# Patient Record
Sex: MALE | Race: WHITE
[De-identification: names, ages, dates, MRNs, and addresses within clinical notes are randomized per-mention and may not be internally consistent; named-entity substitution may affect disease eponyms.]

---

## 2017-06-21 ENCOUNTER — HOSPITAL ENCOUNTER (EMERGENCY)
Dept: HOSPITAL 33 - ED | Age: 52
LOS: 1 days | Discharge: ANOTHER HOSPITAL | End: 2017-06-22
Payer: SELF-PAY

## 2017-06-21 DIAGNOSIS — T18.128A: Primary | ICD-10-CM

## 2017-06-21 PROCEDURE — 36000 PLACE NEEDLE IN VEIN: CPT

## 2017-06-21 PROCEDURE — 96375 TX/PRO/DX INJ NEW DRUG ADDON: CPT

## 2017-06-21 PROCEDURE — 99285 EMERGENCY DEPT VISIT HI MDM: CPT

## 2017-06-21 PROCEDURE — 96374 THER/PROPH/DIAG INJ IV PUSH: CPT

## 2017-06-21 PROCEDURE — 70360 X-RAY EXAM OF NECK: CPT

## 2017-06-21 PROCEDURE — 96376 TX/PRO/DX INJ SAME DRUG ADON: CPT

## 2017-06-21 NOTE — ERPHSYRPT
- History of Present Illness


Time Seen by Provider: 06/21/17 23:33


Source: patient


Exam Limitations: no limitations


Patient Subjective Stated Complaint: pt states he got choked on a piece of 

turkey this evening adn feels like it is still stuck in his throat.


Triage Nursing Assessment: pt alert and oriented. answers questions approp. pt 

ambulatory with steady gait noted. respirations nonlabored with lungs cta. pt 

restless in bed and holding throat when he swallows. nothing visualized in back 

of throat. no gagging or vomiting at this time.


Physician History: 





The patient is a 51-year-old male with his wife complaining of hitting a piece 

of turkey meat stuck in his throat while eating 7 hours ago.  He was chewing 

the meat when it accidentally slipped down his throat.  He coughed it up.  He 

felt like there was still a small piece stuck in his throat so he stuck his 

finger down his throat in order to make him vomit.  He vomited.  Now it feels 

like his throat is very painful when he swallows.  He is unsure if anything is 

still in his throat.  He has been able to drink significant amounts of water.  

When he vomited, he had some blood in his vomit.  He has no problems breathing.

  His past medical history is significant for COPD and diabetes.


Timing/Duration: hour(s) (7), sudden


Severity: moderate


Modifying Factors: Improves With: nothing


Associated Symptoms: vomiting (self-induced)


Allergies/Adverse Reactions: 








No Known Drug Allergies Allergy (Verified 06/21/17 23:33)


 





Home Medications: 








Metformin HCl 1000 mg [Glucophage 1000 MG] 1,000 mg PO BID 04/03/15 [History]


Hum Insulin NPH/Reg Insulin Hm [Humulin 70-30 Vial] 30 unit SQ BID 06/21/17 [

History]





Hx Tetanus, Diphtheria Vaccination/Date Given: Yes


Hx Influenza Vaccination/Date Given: No


Hx Pneumococcal Vaccination/Date Given: No


Immunizations Up to Date: Yes





- Review of Systems


Constitutional: No Fever, No Chills


Eyes: No Symptoms


Ears, Nose, & Throat: Throat Pain


Respiratory: No Cough, No Dyspnea


Cardiac: No Chest Pain, No Edema, No Syncope


Abdominal/Gastrointestinal: Vomiting (self-induced)


Genitourinary Symptoms: No Dysuria


Musculoskeletal: No Back Pain, No Neck Pain


Skin: No Rash


Neurological: No Dizziness, No Focal Weakness, No Sensory Changes


Psychological: No Symptoms


Endocrine: No Symptoms


Hematologic/Lymphatic: No Symptoms


Immunological/Allergic: No Symptoms


All Other Systems: Reviewed and Negative





- Past Medical History


Pertinent Past Medical History: Yes


Endocrine Medical History: Diabetes Type II





- Past Surgical History


Past Surgical History: No





- Social History


Smoking Status: Current every day smoker


How long have you smoked: 35 yrs


Exposure to second hand smoke: Yes


Drug Use: none


Patient Lives Alone: No





- Nursing Vital Signs


Nursing Vital Signs: 


 Initial Vital Signs











Temperature                    97.9 F


 


Temperature Source             Oral


 


Pulse Rate                     96


 


Respiratory Rate               16


 


Blood Pressure []              99/68


 


Blood Pressure                 122/83


 


Pain Intensity                 6

















- Physical Exam


General Appearance: moderate distress (distress only when swallowing)


Eye Exam: PERRL/EOMI, eyes nml inspection


Ears, Nose, Throat Exam: normal ENT inspection, TMs normal, pharynx normal, 

moist mucous membranes


Neck Exam: normal inspection, non-tender, supple, full range of motion


Respiratory Exam: normal breath sounds, lungs clear, No respiratory distress


Cardiovascular Exam: regular rate/rhythm, normal heart sounds, normal 

peripheral pulses


Gastrointestinal/Abdomen Exam: soft, normal bowel sounds, No tenderness, No mass


Rectal Exam: not done


Back Exam: normal inspection, normal range of motion, No CVA tenderness, No 

vertebral tenderness


Extremity Exam: normal inspection, normal range of motion, pelvis stable


Neurologic Exam: alert, oriented x 3, cooperative, normal mood/affect, nml 

cerebellar function, nml station & gait, sensation nml, No motor deficits


Skin Exam: normal color, warm, dry, No rash


Lymphatic Exam: No adenopathy


**SpO2 Interpretation**: normal


SpO2: 100


Oxygen Delivery: Room Air





- Radiology Exams


  ** Other


X-ray Interpretation: Interpreted by me, Other (Possible FB in proximal 

anterior esophagus.)


Ordered Tests: 


 Active Orders 24 hr











 Category Date Time Status


 


 IV Insertion STAT Care  06/21/17 23:46 Active


 


 NECK SOFT TISSUE Stat Exams  06/21/17 23:45 Ordered








Medication Summary











Generic Name Dose Route Start Last Admin





  Trade Name Freq  PRN Reason Stop Dose Admin


 


Nitroglycerin  0.4 mg  06/22/17 01:00  06/22/17 01:04





  Nitrostat 0.4 Mg Tablet***  SL  07/22/17 00:59  0.4 mg





  PRN PRN   Administration





  CHEST PAIN   














Discontinued Medications














Generic Name Dose Route Start Last Admin





  Trade Name Freq  PRN Reason Stop Dose Admin


 


Al Hydrox/Mg Hydrox/Simethicone  Confirm  06/22/17 00:04  





  Maalox Es 30 Ml Unit Dose***  Administered  06/22/17 00:05  





  Dose   





  30 ml   





  .ROUTE   





  .STK-MED ONE   


 


Belladonna Alkaloids/Phenobarbital  60 ml  06/21/17 23:45  06/22/17 00:09





  Gi Cocktail 60ml (Belladonn/Phenobarb/Lidoc*  PO  06/21/17 23:46  60 ml





  STAT ONE   Administration


 


Belladonna Alkaloids/Phenobarbital  Confirm  06/22/17 00:04  





  Donnatol Liquid***  Administered  06/22/17 00:05  





  Dose   





  64.8 mg   





  .ROUTE   





  .STK-MED ONE   


 


Glucagon  Confirm  06/22/17 00:28  





  Glucagen 1 Mg***  Administered  06/22/17 00:29  





  Dose   





  1 mg   





  .ROUTE   





  .STK-MED ONE   


 


Glucagon  1 mg  06/22/17 00:29  06/22/17 00:30





  Glucagen 1 Mg***  IV  06/22/17 00:30  1 mg





  STAT ONE   Administration


 


Glucagon  1 mg  06/22/17 00:46  06/22/17 00:52





  Glucagen 1 Mg***  IV  06/22/17 00:47  1 mg





  STAT ONE   Administration


 


Lidocaine HCl  Confirm  06/22/17 00:03  





  Xylocaine Hcl Viscous *  Administered  06/22/17 00:04  





  Dose   





  20 ml   





  .ROUTE   





  .STK-MED ONE   


 


Morphine Sulfate  4 mg  06/22/17 00:33  06/22/17 00:38





  Morphine Sulfate 4 Mg Inj***  IV  06/22/17 00:34  4 mg





  STAT ONE   Administration


 


Morphine Sulfate  Confirm  06/22/17 00:36  





  Morphine Sulfate 4 Mg Inj***  Administered  06/22/17 00:37  





  Dose   





  4 mg   





  .ROUTE   





  .STK-MED ONE   


 


Morphine Sulfate  4 mg  06/22/17 01:13  06/22/17 01:15





  Morphine Sulfate 4 Mg Inj***  IV  06/22/17 01:14  4 mg





  STAT ONE   Administration


 


Morphine Sulfate  Confirm  06/22/17 01:15  





  Morphine Sulfate 4 Mg Inj***  Administered  06/22/17 01:16  





  Dose   





  4 mg   





  .ROUTE   





  .STK-MED ONE   


 


Nitroglycerin  Confirm  06/22/17 01:00  





  Nitrostat 0.4 Mg (Ed)***  Administered  06/22/17 01:01  





  Dose   





  0.4 mg   





  SL   





  .STK-MED ONE   














- Progress


Progress: unchanged


Progress Note: 





06/22/17 01:13


The patient was given glucagon 1 mg 2 by IV and nitroglycerin 0.4 mg 

sublingual without improvement.  The patient consumed several ounces of a 

carbonated beverage without relief and without vomiting.  The patient complains 

of severe proximal esophageal pain.


Counseled pt/family regarding: diagnosis, rad results





- Departure


Time of Disposition: 01:41


Departure Disposition: Transfer (Transfer to [facility] per hospitalist, Dr Rivers, and GI doctor, Dr Velazquez.)


Clinical Impression: 


 Esophageal foreign body





Condition: Stable


Critical Care Time: No


Additional Instructions: 


You are being transferred to [facility] per  and Dr Velazquez for an 

esophageal foreign body.  You were given glucagon 1 mg IV 2 and nitroglycerin 

glycerin 0.4 mg sublingual in the ER.  You were also given morphine 4 mg IV 2.

## 2017-06-22 VITALS — SYSTOLIC BLOOD PRESSURE: 97 MMHG | DIASTOLIC BLOOD PRESSURE: 64 MMHG | HEART RATE: 78 BPM | OXYGEN SATURATION: 98 %

## 2017-06-22 NOTE — XRAY
Exam: Two-view neck soft tissues from 6/22/2017.



Comparison: None.



Indication: Patient choked while eating turkey.  States he feels like it is

stuck at the C5 or C6 level.  Rule out foreign body.



Findings: Upright AP and lateral images were obtained.



The epiglottis, hypopharynx, and tracheal airway appear unremarkable.  No

prevertebral soft tissue swelling is seen.  The hyoid bone and thyroid

cartilage appear unremarkable.



There is a 4 mm in length, transversely oriented, linear radiodensity anterior

to the C5 level which could possibly relate to a small ingested foreign body,

perhaps representing a tiny bone fragment.



I note early/mild degenerative disc disease at C4-C5 manifested by minimal

narrowing of the interspace with mild anterior and minimal posterior vertebral

endplate spurring.  The remainder of the cervical spine appears unremarkable.



Impression:



1.  There is a 4 mm transverse linear density seen about 13-14 mm anterior to

the lower C5 level on the lateral radiograph of unclear etiology.  This could

possibly represent an ingested foreign body density within the proximal

cervical esophagus ( ? bone fragment).



2.  Mild/early degenerative disc disease is seen at C4-C5.

## 2018-09-02 ENCOUNTER — HOSPITAL ENCOUNTER (EMERGENCY)
Dept: HOSPITAL 33 - ED | Age: 53
Discharge: HOME | End: 2018-09-02
Payer: SELF-PAY

## 2018-09-02 VITALS — OXYGEN SATURATION: 99 % | SYSTOLIC BLOOD PRESSURE: 109 MMHG | DIASTOLIC BLOOD PRESSURE: 96 MMHG | HEART RATE: 68 BPM

## 2018-09-02 DIAGNOSIS — S03.2XXA: ICD-10-CM

## 2018-09-02 DIAGNOSIS — R56.9: Primary | ICD-10-CM

## 2018-09-02 DIAGNOSIS — E11.9: ICD-10-CM

## 2018-09-02 DIAGNOSIS — Z79.4: ICD-10-CM

## 2018-09-02 LAB
ALBUMIN SERPL-MCNC: 4.8 G/DL (ref 3.5–5)
ALP SERPL-CCNC: 96 U/L (ref 38–126)
ALT SERPL-CCNC: 20 U/L (ref 0–50)
AMPHETAMINES UR QL: NEGATIVE
ANION GAP SERPL CALC-SCNC: 18 MEQ/L (ref 5–15)
AST SERPL QL: 23 U/L (ref 17–59)
BARBITURATES UR QL: NEGATIVE
BASOPHILS # BLD AUTO: 0.03 10*3/UL (ref 0–0.4)
BASOPHILS NFR BLD AUTO: 0.3 % (ref 0–0.4)
BENZODIAZ UR QL SCN: NEGATIVE
BILIRUB BLD-MCNC: 0.3 MG/DL (ref 0.2–1.3)
BUN SERPL-MCNC: 18 MG/DL (ref 9–20)
CALCIUM SPEC-MCNC: 9.4 MG/DL (ref 8.4–10.2)
CHLORIDE SERPL-SCNC: 105 MMOL/L (ref 98–107)
CO2 SERPL-SCNC: 24 MMOL/L (ref 22–30)
COCAINE UR QL SCN: NEGATIVE
CREAT SERPL-MCNC: 0.87 MG/DL (ref 0.66–1.25)
EOSINOPHIL # BLD AUTO: 0.08 10*3/UL (ref 0–0.5)
GLUCOSE SERPL-MCNC: 80 MG/DL (ref 74–106)
GLUCOSE UR-MCNC: NEGATIVE MG/DL
GRANULOCYTES # BLD AUTO: 8.65 10*3/UL (ref 1.4–6.9)
HCT VFR BLD AUTO: 43.1 % (ref 42–50)
HGB BLD-MCNC: 14.7 GM/DL (ref 12.5–18)
LYMPHOCYTES # SPEC AUTO: 2.07 10*3/UL (ref 1–4.6)
MCH RBC QN AUTO: 32.3 PG (ref 26–32)
MCHC RBC AUTO-ENTMCNC: 34.1 G/DL (ref 32–36)
METHADONE UR QL: NEGATIVE
MONOCYTES # BLD AUTO: 0.63 10*3/UL (ref 0–1.3)
NEUTROPHILS NFR BLD AUTO: 75.4 % (ref 36–66)
OPIATES UR QL: NEGATIVE
PCP UR QL CFM>20 NG/ML: NEGATIVE
PLATELET # BLD AUTO: 261 K/MM3 (ref 150–450)
POTASSIUM SERPLBLD-SCNC: 3.7 MMOL/L (ref 3.5–5.1)
PROT SERPL-MCNC: 8 G/DL (ref 6.3–8.2)
PROT UR STRIP-MCNC: (no result) MG/DL
RBC # BLD AUTO: 4.55 M/MM3 (ref 4.1–5.6)
RBC # URNS HPF: (no result) /HPF (ref 0–2)
SODIUM SERPL-SCNC: 143 MMOL/L (ref 137–145)
THC UR QL SCN: NEGATIVE
WBC # BLD AUTO: 11.5 K/MM3 (ref 4–10.5)
WBC URNS QL MICRO: (no result) /HPF (ref 0–5)

## 2018-09-02 PROCEDURE — 81000 URINALYSIS NONAUTO W/SCOPE: CPT

## 2018-09-02 PROCEDURE — 70450 CT HEAD/BRAIN W/O DYE: CPT

## 2018-09-02 PROCEDURE — G0480 DRUG TEST DEF 1-7 CLASSES: HCPCS

## 2018-09-02 PROCEDURE — 99284 EMERGENCY DEPT VISIT MOD MDM: CPT

## 2018-09-02 PROCEDURE — 93005 ELECTROCARDIOGRAM TRACING: CPT

## 2018-09-02 PROCEDURE — 80307 DRUG TEST PRSMV CHEM ANLYZR: CPT

## 2018-09-02 PROCEDURE — 85025 COMPLETE CBC W/AUTO DIFF WBC: CPT

## 2018-09-02 PROCEDURE — 36415 COLL VENOUS BLD VENIPUNCTURE: CPT

## 2018-09-02 PROCEDURE — 82962 GLUCOSE BLOOD TEST: CPT

## 2018-09-02 PROCEDURE — 96361 HYDRATE IV INFUSION ADD-ON: CPT

## 2018-09-02 PROCEDURE — 80053 COMPREHEN METABOLIC PANEL: CPT

## 2018-09-02 PROCEDURE — 96360 HYDRATION IV INFUSION INIT: CPT

## 2018-09-02 NOTE — XRAY
Indication: Nausea, headache, and loss of consciousness.  Seizure.



Multiple contiguous axial images obtained through the head without contrast.



Comparison: None



Normal appearing brain parenchyma, ventricles, and bony calvarium.  Minimal

mucosal thickening of both maxillary sinuses.  Remaining visualized paranasal

sinuses and mastoid air cells are clear.



Impression: No acute intracranial abnormalities.  Minimal paranasal sinus

disease.



Comment: Preliminary interpretation was made by [facility].  No discrepancy.



CTDI 49.71

## 2018-09-02 NOTE — ERPHSYRPT
- History of Present Illness


Time Seen by Provider: 09/02/18 09:32


Source: patient


Exam Limitations: no limitations


Physician History: 





This 52-year-old white male with history of diabetes COPD.


He is brought by the medics patient apparently had a seizure in bed next to his 

significant other.


Patient was noted to be postictal by the medics prior to arrival.


Currently patient is alert oriented 3.  Patient denies previous seizure 

activity.








Past medical history includes COPD and diabetes.





Past surgical history is negative.





Social history positive tobacco use.


Occasional alcohol use last time 2 weeks ago.


Denies illicit drug use.











Timing/Duration: today (just prior to arrival)


Severity: moderate


Modifying Factors: Improves With: nothing


Associated Symptoms: other (patient had a seizure aned bit his tongue just 

prior to arrival), No nausea, No vomiting, No abdominal pain, No shortness of 

breath, No heartburn, No diaphoresis, No cough, No chills, No chest pain, No 

fever, No headaches, No loss of appetite, No malaise, No rash, No syncope, No 

seizure, No weakness


Allergies/Adverse Reactions: 








No Known Drug Allergies Allergy (Verified 06/21/17 23:33)


 





Home Medications: 








Metformin HCl 1000 mg [Glucophage 1000 MG] 1,000 mg PO BID 04/03/15 [History]


Hum Insulin NPH/Reg Insulin Hm [Humulin 70-30 Vial] 30 unit SQ BID 06/21/17 [

History]





Hx Tetanus, Diphtheria Vaccination/Date Given: Yes


Hx Influenza Vaccination/Date Given: No


Hx Pneumococcal Vaccination/Date Given: No





- Review of Systems


Constitutional: No Fever, No Chills


Eyes: No Symptoms


Ears, Nose, & Throat: Other (patient bit his tongue), No Ear Pain, No Ear 

Discharge, No Hearing Changes, No Tinnitus, No Nose Pain, No Nose Congestion, 

No Nose Discharge, No Sinus Drainage, No Epistaxis, No Mouth Pain, No Mouth 

Swelling, No Loose Teeth, No Throat Pain, No Throat Swelling, No Hoarse, No 

Painful Swallowing, No Snoring, No Stridor


Respiratory: No Cough, No Dyspnea


Cardiac: No Chest Pain, No Edema, No Syncope


Abdominal/Gastrointestinal: No Abdominal Pain, No Nausea, No Vomiting, No 

Diarrhea


Genitourinary Symptoms: No Dysuria


Musculoskeletal: No Back Pain, No Neck Pain


Skin: No Rash


Neurological: Seizure (patiet had a seizure just pr heior to arrival)


Psychological: No Symptoms


Endocrine: No Symptoms


All Other Systems: Reviewed and Negative





- Past Medical History


Pertinent Past Medical History: Yes


Endocrine Medical History: Diabetes Type II





- Past Surgical History


Past Surgical History: No





- Social History


Smoking Status: Current every day smoker


How long have you smoked: 35 yrs


Exposure to second hand smoke: Yes


Drug Use: none


Patient Lives Alone: No





- Nursing Vital Signs


Nursing Vital Signs: 


 Initial Vital Signs











Temperature  97.8 F   09/02/18 09:26


 


Pulse Rate  78   09/02/18 09:26


 


Respiratory Rate  18   09/02/18 09:26


 


Blood Pressure  119/79   09/02/18 09:26


 


O2 Sat by Pulse Oximetry  98   09/02/18 09:26








 Pain Scale











Pain Intensity                 6

















- Physical Exam


General Appearance: no apparent distress, alert


Eye Exam: PERRL/EOMI, eyes nml inspection


Ears, Nose, Throat Exam: normal ENT inspection, TMs normal, pharynx normal, 

moist mucous membranes, other (several abrasions on tongue,avulsed right lower 

incisor, )


Neck Exam: normal inspection, non-tender, supple, full range of motion


Respiratory Exam: normal breath sounds, lungs clear, No respiratory distress


Cardiovascular Exam: regular rate/rhythm ( tooth him follow-up wit), normal 

heart sounds, normal peripheral pulses


Gastrointestinal/Abdomen Exam: soft, normal bowel sounds, No tenderness, No mass


Back Exam: normal inspection, normal range of motion, No CVA tenderness, No 

vertebral tenderness


Extremity Exam: normal inspection, normal range of motion, pelvis stable


Neurologic Exam: alert, oriented x 3, cooperative, CNs II-XII nml as tested, 

normal mood/affect, nml cerebellar function, nml station & gait, sensation nml, 

No motor deficits


Skin Exam: normal color, warm, dry, No rash


**SpO2 Interpretation**: normal (97%)





- Course


Nursing assessment & vital signs reviewed: Yes


EKG Interpreted by Me: RATE (72 bpm), Sinus Rhythm, NORMAL AXIS, Other (EKG: 

Sinus rhythm, 72 bpm, normal axis, no acute ST or T wave changes noted.)





- CT Exams


  ** Head


CT Interpretation: Tele-radiologist Report (head CT: Impression: 1.  No acute 

intracranial abnormality, 2.  Volume loss.)


Ordered Tests: 


 Active Orders 24 hr











 Category Date Time Status


 


 Accucheck STAT Care  09/02/18 09:30 Active


 


 EKG-ER Only STAT Care  09/02/18 09:30 Active


 


 IV Insertion STAT Care  09/02/18 09:30 Active


 


 Pulse Oximetry (ED) STAT Care  09/02/18 09:30 Active


 


 Seizure Precautions -SCCHED STAT Care  09/02/18 09:30 Active


 


 HEAD WITHOUT CONTRAST [CT] Stat Exams  09/02/18 09:31 Taken


 


 CBC W DIFF Stat Lab  09/02/18 09:30 Completed


 


 CMP Stat Lab  09/02/18 09:30 Completed


 


 ETHYL ALCOHOL Stat Lab  09/02/18 09:30 Completed


 


 UA W/ MICROSCOPIC Stat Lab  09/02/18 11:00 Completed


 


 Urine Triage Profile Stat Lab  09/02/18 11:00 Completed








Medication Summary











Generic Name Dose Route Start Last Admin





  Trade Name Freq  PRN Reason Stop Dose Admin


 


Sodium Chloride  1,000 mls @ 100 mls/hr  09/02/18 09:30  09/02/18 10:46





  Sodium Chloride 0.9% 1000 Ml  IV  10/02/18 09:29  100 mls/hr





  .Q10H SAURABH   Administration





     





     





     





     














Discontinued Medications














Generic Name Dose Route Start Last Admin





  Trade Name Freq  PRN Reason Stop Dose Admin


 


Acetaminophen  650 mg  09/02/18 10:19  09/02/18 10:46





  Tylenol 325 Mg***  PO  09/02/18 10:20  650 mg





  STAT ONE   Administration





     





     





     





     


 


Acetaminophen  Confirm  09/02/18 10:38  





  Tylenol 325 Mg***  Administered  09/02/18 10:39  





  Dose   





  650 mg   





  .ROUTE   





  .STK-MED ONE   





     





     





     





     


 


Hydrocodone Bitart/Acetaminophen  1 tab  09/02/18 11:22  09/02/18 11:25





  Norco 5/325 Mg***  PO  09/02/18 11:23  1 tab





  STAT ONE   Administration





     





     





     





     


 


Hydrocodone Bitart/Acetaminophen  Confirm  09/02/18 11:24  





  Norco 5/325 Mg***  Administered  09/02/18 11:25  





  Dose   





  1 tab   





  .ROUTE   





  .STK-MED ONE   





     





     





     





     











Lab/Rad Data: 


 Laboratory Result Diagrams





 09/02/18 09:30 





 09/02/18 09:30 





 Laboratory Results











  09/02/18 09/02/18 09/02/18 Range/Units





  11:00 11:00 09:30 


 


WBC     (4.0-10.5)  K/mm3


 


RBC     (4.1-5.6)  M/mm3


 


Hgb     (12.5-18.0)  gm/dl


 


Hct     (42-50)  %


 


MCV     ()  fl


 


MCH     (26-32)  pg


 


MCHC     (32-36)  g/dl


 


RDW     (11.5-14.0)  %


 


Plt Count     (150-450)  K/mm3


 


MPV     (6-9.5)  fl


 


Gran %     (36.0-66.0)  %


 


Eos # (Auto)     (0-0.5)  


 


Absolute Lymphs (auto)     (1.0-4.6)  


 


Absolute Monos (auto)     (0.0-1.3)  


 


Lymphocytes %     (24.0-44.0)  %


 


Monocytes %     (0.0-12.0)  %


 


Eosinophils %     (0.00-5.0)  %


 


Basophils %     (0.0-0.4)  %


 


Absolute Granulocytes     (1.4-6.9)  


 


Basophils #     (0-0.4)  


 


Sodium     (137-145)  mmol/L


 


Potassium     (3.5-5.1)  mmol/L


 


Chloride     ()  mmol/L


 


Carbon Dioxide     (22-30)  mmol/L


 


Anion Gap     (5-15)  MEQ/L


 


BUN     (9-20)  mg/dL


 


Creatinine     (0.66-1.25)  mg/dL


 


Estimated GFR     ML/MIN


 


Glucose     ()  mg/dL


 


Calcium     (8.4-10.2)  mg/dL


 


Total Bilirubin     (0.2-1.3)  mg/dL


 


AST     (17-59)  U/L


 


ALT     (0-50)  U/L


 


Alkaline Phosphatase     ()  U/L


 


Serum Total Protein     (6.3-8.2)  g/dL


 


Albumin     (3.5-5.0)  g/dL


 


Ur Collection Type   CCMS   


 


Urine Color   YELLOW   (YELLOW)  


 


Urine Appearance   CLEAR   (CLEAR)  


 


Urine pH   5.0   (5-6)  


 


Ur Specific Gravity   1.020   (1.005-1.025)  


 


Urine Protein   TRACE   (Negative)  


 


Urine Ketones   MODERATE   (NEGATIVE)  


 


Urine Blood   250   (0-5)  Ernie/ul


 


Urine Nitrite   NEGATIVE   (NEGATIVE)  


 


Urine Bilirubin   NEGATIVE   (NEGATIVE)  


 


Urine Urobilinogen   NORMAL   (0-1)  mg/dL


 


Ur Leukocyte Esterase   NEGATIVE   (NEGATIVE)  


 


Urine Microscopic RBC   2-5   (0-2)  /HPF


 


Urine Microscopic WBC   0-2   (0-5)  /HPF


 


Ur Epithelial Cells   RARE   (FEW)  /HPF


 


Granular Casts   0-2   (NEGATIVE)  /LPF


 


Urine Mucus   MODERATE   (NEGATIVE)  /HPF


 


Urine Culture Reflexed   NO   (NO)  


 


Urine Glucose   NEGATIVE   (NEGATIVE)  mg/dL


 


Urine Opiates Level  NEGATIVE    (NEGATIVE)  


 


Ur Methadone  NEGATIVE    (NEGATIVE)  


 


Urine Barbiturates  NEGATIVE    (NEGATIVE)  


 


Ur Phencyclidine (PCP)  NEGATIVE    (NEGATIVE)  


 


Urine Amphetamine  NEGATIVE    (NEGATIVE)  


 


U Benzodiazepine Level  NEGATIVE    (NEGATIVE)  


 


Urine Cocaine  NEGATIVE    (NEGATIVE)  


 


Urine Marijuana (THC)  NEGATIVE    (NEGATIVE)  


 


Ethyl Alcohol    < 10  (0-10)  mg/dL


 


Specimen Received   1100 09/02/18 09/02/18 09/02/18 Range/Units





  09:30 09:30 


 


WBC   11.5 H  (4.0-10.5)  K/mm3


 


RBC   4.55  (4.1-5.6)  M/mm3


 


Hgb   14.7  (12.5-18.0)  gm/dl


 


Hct   43.1  (42-50)  %


 


MCV   94.7  ()  fl


 


MCH   32.3 H  (26-32)  pg


 


MCHC   34.1  (32-36)  g/dl


 


RDW   12.6  (11.5-14.0)  %


 


Plt Count   261  (150-450)  K/mm3


 


MPV   10.8 H  (6-9.5)  fl


 


Gran %   75.4 H  (36.0-66.0)  %


 


Eos # (Auto)   0.08  (0-0.5)  


 


Absolute Lymphs (auto)   2.07  (1.0-4.6)  


 


Absolute Monos (auto)   0.63  (0.0-1.3)  


 


Lymphocytes %   18.1 L  (24.0-44.0)  %


 


Monocytes %   5.5  (0.0-12.0)  %


 


Eosinophils %   0.7  (0.00-5.0)  %


 


Basophils %   0.3  (0.0-0.4)  %


 


Absolute Granulocytes   8.65 H  (1.4-6.9)  


 


Basophils #   0.03  (0-0.4)  


 


Sodium  143   (137-145)  mmol/L


 


Potassium  3.7   (3.5-5.1)  mmol/L


 


Chloride  105   ()  mmol/L


 


Carbon Dioxide  24   (22-30)  mmol/L


 


Anion Gap  18.0 H   (5-15)  MEQ/L


 


BUN  18   (9-20)  mg/dL


 


Creatinine  0.87   (0.66-1.25)  mg/dL


 


Estimated GFR  > 60.0   ML/MIN


 


Glucose  80   ()  mg/dL


 


Calcium  9.4   (8.4-10.2)  mg/dL


 


Total Bilirubin  0.30   (0.2-1.3)  mg/dL


 


AST  23   (17-59)  U/L


 


ALT  20   (0-50)  U/L


 


Alkaline Phosphatase  96   ()  U/L


 


Serum Total Protein  8.0   (6.3-8.2)  g/dL


 


Albumin  4.8   (3.5-5.0)  g/dL


 


Ur Collection Type    


 


Urine Color    (YELLOW)  


 


Urine Appearance    (CLEAR)  


 


Urine pH    (5-6)  


 


Ur Specific Gravity    (1.005-1.025)  


 


Urine Protein    (Negative)  


 


Urine Ketones    (NEGATIVE)  


 


Urine Blood    (0-5)  Ernie/ul


 


Urine Nitrite    (NEGATIVE)  


 


Urine Bilirubin    (NEGATIVE)  


 


Urine Urobilinogen    (0-1)  mg/dL


 


Ur Leukocyte Esterase    (NEGATIVE)  


 


Urine Microscopic RBC    (0-2)  /HPF


 


Urine Microscopic WBC    (0-5)  /HPF


 


Ur Epithelial Cells    (FEW)  /HPF


 


Granular Casts    (NEGATIVE)  /LPF


 


Urine Mucus    (NEGATIVE)  /HPF


 


Urine Culture Reflexed    (NO)  


 


Urine Glucose    (NEGATIVE)  mg/dL


 


Urine Opiates Level    (NEGATIVE)  


 


Ur Methadone    (NEGATIVE)  


 


Urine Barbiturates    (NEGATIVE)  


 


Ur Phencyclidine (PCP)    (NEGATIVE)  


 


Urine Amphetamine    (NEGATIVE)  


 


U Benzodiazepine Level    (NEGATIVE)  


 


Urine Cocaine    (NEGATIVE)  


 


Urine Marijuana (THC)    (NEGATIVE)  


 


Ethyl Alcohol    (0-10)  mg/dL


 


Specimen Received    














- Progress


Progress: improved


Progress Note: 





09/02/18 09:42


This is a 52-year-old white male who arrives with complaint of a seizure and 

was found in bed after tonic-clonic activity noted by his wife.


Patient initially postictally patient denies history of seizures in the past.


Patient apparently pulled a tooth out of his right lower incisor region.


This is been placed in milk.


Patient does have a history of diabetes he states he drinks occasionally last 

time he drank was 2 weeks ago he denies any illicit drug use.





.





09/02/18 10:46


I contacted Dr. Samuel at [facility] in [place] concerning the 

patient's avulsed tooth.


He states that the patient can present to the [place] office when he is done 

here..





Patient's tooth was placed in milk.


It is noted that there is only 1 4- 1/3 third of the tooth which appears to be 

root.





Head CT has been obtained.


I am awaiting urinalysis and UDS patient appears to be stable.


He was given Tylenol for pain in his tongue.








09/02/18 11:25


Patient's lab work was all essentially normal with the exception of moderate 

ketones in his urine.


Patient's glucose was running in the 80s.


Patient was given on Norco tablet because of complaints of pain.


The patient has been stable since arrival no further signs of seizures.








Will plan to discharge patient.


He will be given instructions as to no driving, no hazardous activity no 

heights.


He will need to follow-up with his family doctor.


He will also need to follow-up with 24-hour dental care [facility] in 

[place] on [address].


Today he is to bring his tooth with him.








- Departure


Time of Disposition: 11:27


Departure Disposition: Home


Clinical Impression: 


 Seizure





Avulsed tooth


Qualifiers:


 Encounter type: initial encounter Qualified Code(s): S03.2XXA - Dislocation of 

tooth, initial encounter





Condition: Fair


Critical Care Time: No


Referrals: 


XIN VERDUGO MD [Primary Care Provider] - 


Instructions:  Seizures, Adult (DC)


Additional Instructions: 


Return home.


No driving, no heights, no hazardous activity, take showers instead of baths, 

do not engage in any activity that might harm yourself or others.


Follow-up with your family doctor.


Follow-up with 24 hour dental care [facility], [address] [place], [place] 

today.bring your tooth with you leave it in milk.


Monitor your blood sugars carefully.


Return for acute distress or for severe symptoms.





Refer to 24 hour dental care


[phone] [address]

## 2018-09-26 ENCOUNTER — HOSPITAL ENCOUNTER (OUTPATIENT)
Dept: HOSPITAL 33 - ED | Age: 53
Setting detail: OBSERVATION
LOS: 2 days | Discharge: HOME | End: 2018-09-28
Attending: FAMILY MEDICINE | Admitting: FAMILY MEDICINE
Payer: COMMERCIAL

## 2018-09-26 DIAGNOSIS — R11.10: ICD-10-CM

## 2018-09-26 DIAGNOSIS — E11.10: Primary | ICD-10-CM

## 2018-09-26 DIAGNOSIS — Z79.899: ICD-10-CM

## 2018-09-26 DIAGNOSIS — Z79.4: ICD-10-CM

## 2018-09-26 DIAGNOSIS — F41.8: ICD-10-CM

## 2018-09-26 LAB
ALBUMIN SERPL-MCNC: 5.6 G/DL (ref 3.5–5)
ALP SERPL-CCNC: 167 U/L (ref 38–126)
ALT SERPL-CCNC: 24 U/L (ref 0–50)
AMPHETAMINES UR QL: NEGATIVE
AMYLASE SERPL-CCNC: 53 U/L (ref 30–110)
ANION GAP SERPL CALC-SCNC: 17.3 MEQ/L (ref 5–15)
ANION GAP SERPL CALC-SCNC: 27.4 MEQ/L (ref 5–15)
ANION GAP SERPL CALC-SCNC: 37.7 MEQ/L (ref 5–15)
APAP SPEC-MCNC: < 10 UG/ML (ref 10–30)
AST SERPL QL: 22 U/L (ref 17–59)
BARBITURATES UR QL: NEGATIVE
BASE EXCESS BLDV CALC-SCNC: -22.5 MMOL/L (ref -2–2)
BASOPHILS # BLD AUTO: 0.03 10*3/UL (ref 0–0.4)
BASOPHILS NFR BLD AUTO: 0.3 % (ref 0–0.4)
BENZODIAZ UR QL SCN: NEGATIVE
BILIRUB BLD-MCNC: 0.5 MG/DL (ref 0.2–1.3)
BUN SERPL-MCNC: 18 MG/DL (ref 9–20)
BUN SERPL-MCNC: 21 MG/DL (ref 9–20)
BUN SERPL-MCNC: 21 MG/DL (ref 9–20)
BUN SERPL-MCNC: 23 MG/DL (ref 9–20)
CALCIUM SPEC-MCNC: 10.1 MG/DL (ref 8.4–10.2)
CALCIUM SPEC-MCNC: 8.7 MG/DL (ref 8.4–10.2)
CALCIUM SPEC-MCNC: 8.9 MG/DL (ref 8.4–10.2)
CALCIUM SPEC-MCNC: 8.9 MG/DL (ref 8.4–10.2)
CHLORIDE SERPL-SCNC: 100 MMOL/L (ref 98–107)
CHLORIDE SERPL-SCNC: 106 MMOL/L (ref 98–107)
CHLORIDE SERPL-SCNC: 108 MMOL/L (ref 98–107)
CHLORIDE SERPL-SCNC: 109 MMOL/L (ref 98–107)
CO2 SERPL-SCNC: 13 MMOL/L (ref 22–30)
CO2 SERPL-SCNC: 8 MMOL/L (ref 22–30)
CO2 SERPL-SCNC: 8 MMOL/L (ref 22–30)
CO2 SERPL-SCNC: < 5 MMOL/L (ref 22–30)
COCAINE UR QL SCN: NEGATIVE
COHGB MFR BLDV: 2 % T HGB (ref 0–6.9)
CREAT SERPL-MCNC: 0.89 MG/DL (ref 0.66–1.25)
CREAT SERPL-MCNC: 1.18 MG/DL (ref 0.66–1.25)
CREAT SERPL-MCNC: 1.26 MG/DL (ref 0.66–1.25)
CREAT SERPL-MCNC: 1.38 MG/DL (ref 0.66–1.25)
EOSINOPHIL # BLD AUTO: 0.02 10*3/UL (ref 0–0.5)
GLUCOSE SERPL-MCNC: 187 MG/DL (ref 74–106)
GLUCOSE SERPL-MCNC: 208 MG/DL (ref 74–106)
GLUCOSE SERPL-MCNC: 278 MG/DL (ref 74–106)
GLUCOSE SERPL-MCNC: 303 MG/DL (ref 74–106)
GLUCOSE UR-MCNC: 1000 MG/DL
GRANULOCYTES # BLD AUTO: 7.39 10*3/UL (ref 1.4–6.9)
HCO3 BLDV-SCNC: 8.1 MEQ/L (ref 22–28)
HCT VFR BLD AUTO: 52.7 % (ref 42–50)
HGB BLD-MCNC: 18.2 GM/DL (ref 12.5–18)
HGB BLDV-MCNC: 16.6 G/DL
INHALED O2 CONCENTRATION: 21 %
LIPASE SERPL-CCNC: 41 U/L (ref 23–300)
LYMPHOCYTES # SPEC AUTO: 1.45 10*3/UL (ref 1–4.6)
MCH RBC QN AUTO: 31.8 PG (ref 26–32)
MCHC RBC AUTO-ENTMCNC: 34.5 G/DL (ref 32–36)
METHADONE UR QL: NEGATIVE
MONOCYTES # BLD AUTO: 0.2 10*3/UL (ref 0–1.3)
NEUTROPHILS NFR BLD AUTO: 81.3 % (ref 36–66)
OPIATES UR QL: NEGATIVE
PCO2 BLDV: 33 MM/HG (ref 42–55)
PCP UR QL CFM>20 NG/ML: NEGATIVE
PHOSPHATE SERPL-SCNC: 4.4 MG/DL (ref 2.5–4.5)
PLATELET # BLD AUTO: 302 K/MM3 (ref 150–450)
PO2 BLDV: 29 MM/HG (ref 25–40)
POTASSIUM BLDV-SCNC: 5.7 MMOL/L (ref 3.5–5.1)
POTASSIUM SERPLBLD-SCNC: 3.8 MMOL/L (ref 3.5–5.1)
POTASSIUM SERPLBLD-SCNC: 5 MMOL/L (ref 3.5–5.1)
POTASSIUM SERPLBLD-SCNC: 5.2 MMOL/L (ref 3.5–5.1)
POTASSIUM SERPLBLD-SCNC: 6.9 MMOL/L (ref 3.5–5.1)
PROT SERPL-MCNC: 9.7 G/DL (ref 6.3–8.2)
PROT UR STRIP-MCNC: 100 MG/DL
RBC # BLD AUTO: 5.71 M/MM3 (ref 4.1–5.6)
RBC # URNS HPF: (no result) /HPF (ref 0–2)
SALICYLATES SERPL-MCNC: < 1 MG/DL (ref 2–20)
SAO2 % BLDV: 52.1 % (ref 95–100)
SODIUM SERPL-SCNC: 135 MMOL/L (ref 137–145)
SODIUM SERPL-SCNC: 139 MMOL/L (ref 137–145)
SODIUM SERPL-SCNC: 139 MMOL/L (ref 137–145)
SODIUM SERPL-SCNC: 141 MMOL/L (ref 137–145)
THC UR QL SCN: NEGATIVE
WBC # BLD AUTO: 9.1 K/MM3 (ref 4–10.5)
WBC URNS QL MICRO: (no result) /HPF (ref 0–5)

## 2018-09-26 PROCEDURE — 93041 RHYTHM ECG TRACING: CPT

## 2018-09-26 PROCEDURE — 36415 COLL VENOUS BLD VENIPUNCTURE: CPT

## 2018-09-26 PROCEDURE — 83690 ASSAY OF LIPASE: CPT

## 2018-09-26 PROCEDURE — 87086 URINE CULTURE/COLONY COUNT: CPT

## 2018-09-26 PROCEDURE — 81001 URINALYSIS AUTO W/SCOPE: CPT

## 2018-09-26 PROCEDURE — G0480 DRUG TEST DEF 1-7 CLASSES: HCPCS

## 2018-09-26 PROCEDURE — 93005 ELECTROCARDIOGRAM TRACING: CPT

## 2018-09-26 PROCEDURE — 96375 TX/PRO/DX INJ NEW DRUG ADDON: CPT

## 2018-09-26 PROCEDURE — G0378 HOSPITAL OBSERVATION PER HR: HCPCS

## 2018-09-26 PROCEDURE — 82150 ASSAY OF AMYLASE: CPT

## 2018-09-26 PROCEDURE — 96361 HYDRATE IV INFUSION ADD-ON: CPT

## 2018-09-26 PROCEDURE — 99285 EMERGENCY DEPT VISIT HI MDM: CPT

## 2018-09-26 PROCEDURE — 84100 ASSAY OF PHOSPHORUS: CPT

## 2018-09-26 PROCEDURE — 93268 ECG RECORD/REVIEW: CPT

## 2018-09-26 PROCEDURE — 85025 COMPLETE CBC W/AUTO DIFF WBC: CPT

## 2018-09-26 PROCEDURE — 83036 HEMOGLOBIN GLYCOSYLATED A1C: CPT

## 2018-09-26 PROCEDURE — 82805 BLOOD GASES W/O2 SATURATION: CPT

## 2018-09-26 PROCEDURE — 82962 GLUCOSE BLOOD TEST: CPT

## 2018-09-26 PROCEDURE — 96360 HYDRATION IV INFUSION INIT: CPT

## 2018-09-26 PROCEDURE — 87040 BLOOD CULTURE FOR BACTERIA: CPT

## 2018-09-26 PROCEDURE — 80053 COMPREHEN METABOLIC PANEL: CPT

## 2018-09-26 PROCEDURE — 80307 DRUG TEST PRSMV CHEM ANLYZR: CPT

## 2018-09-26 PROCEDURE — 83735 ASSAY OF MAGNESIUM: CPT

## 2018-09-26 PROCEDURE — 96374 THER/PROPH/DIAG INJ IV PUSH: CPT

## 2018-09-26 PROCEDURE — 36000 PLACE NEEDLE IN VEIN: CPT

## 2018-09-26 PROCEDURE — 94762 N-INVAS EAR/PLS OXIMTRY CONT: CPT

## 2018-09-26 PROCEDURE — 80048 BASIC METABOLIC PNL TOTAL CA: CPT

## 2018-09-26 RX ADMIN — ONDANSETRON PRN MG: 2 INJECTION, SOLUTION INTRAMUSCULAR; INTRAVENOUS at 12:02

## 2018-09-26 RX ADMIN — MORPHINE SULFATE PRN MG: 4 INJECTION, SOLUTION INTRAMUSCULAR; INTRAVENOUS at 11:58

## 2018-09-26 RX ADMIN — SERTRALINE SCH MG: 50 TABLET, FILM COATED ORAL at 22:12

## 2018-09-26 RX ADMIN — MORPHINE SULFATE PRN MG: 4 INJECTION, SOLUTION INTRAMUSCULAR; INTRAVENOUS at 16:05

## 2018-09-26 RX ADMIN — INSULIN ASPART PRN UNIT: 100 INJECTION, SOLUTION INTRAVENOUS; SUBCUTANEOUS at 14:58

## 2018-09-26 RX ADMIN — INSULIN GLARGINE SCH UNIT: 100 INJECTION, SOLUTION SUBCUTANEOUS at 22:12

## 2018-09-26 RX ADMIN — INSULIN ASPART PRN UNIT: 100 INJECTION, SOLUTION INTRAVENOUS; SUBCUTANEOUS at 13:18

## 2018-09-26 RX ADMIN — INSULIN ASPART PRN UNIT: 100 INJECTION, SOLUTION INTRAVENOUS; SUBCUTANEOUS at 11:04

## 2018-09-26 RX ADMIN — ONDANSETRON PRN MG: 2 INJECTION, SOLUTION INTRAMUSCULAR; INTRAVENOUS at 16:06

## 2018-09-26 RX ADMIN — INSULIN ASPART PRN UNIT: 100 INJECTION, SOLUTION INTRAVENOUS; SUBCUTANEOUS at 15:02

## 2018-09-26 RX ADMIN — INSULIN GLARGINE SCH UNIT: 100 INJECTION, SOLUTION SUBCUTANEOUS at 11:04

## 2018-09-26 RX ADMIN — ONDANSETRON PRN MG: 2 INJECTION, SOLUTION INTRAMUSCULAR; INTRAVENOUS at 20:08

## 2018-09-26 RX ADMIN — INSULIN HUMAN PRN MLS/HR: 100 INJECTION, SOLUTION PARENTERAL at 15:34

## 2018-09-26 RX ADMIN — MORPHINE SULFATE PRN MG: 4 INJECTION, SOLUTION INTRAMUSCULAR; INTRAVENOUS at 20:08

## 2018-09-26 NOTE — HP
CHIEF COMPLAINT:  Vomiting, altered mental status. 



HISTORY OF PRESENT ILLNESS: Jigar Rubio is a 52 year-old male with a history of 
diabetes who I have seen in the past but he has been doctoring at a wellness clinic in 
[place] through his employer as of late. He has a history of type 2 diabetes. He is 
currently taking Jardiance 10 mg daily as well as Basaglar 10 units twice daily.  
Apparently he had been referred to Clarissa Headley, a nurse practitioner with [facility], but has not yet seen her but he has been in contact with her. In any event, 
he has been having wild fluctuations of his blood sugars over the proceeding several 
weeks. He was actually in the emergency department earlier this month on 09/02/2018 due to 
a seizure. At that time he was apparently hypoglycemic. He had frequent hypoglycemic 
excursions therefore his insulin which he was taking a mix Humulin 70/30 with 30 units 
subcu twice daily had been changed at that time. He had high blood sugars in the 200 and 
300's for the last several days. He had some confusion, some intermittent vomiting which 
has become more regular today. He is unable to keep anything down. His wife reports that 
he has poor short term memory since he had the seizure earlier this month. 



PAST MEDICAL HISTORY: Anxiety. Depression. Diabetes mellitus type 2. 



PAST SURGICAL HISTORY: He has had no previous surgeries.

 

CURRENT MEDICATIONS: Includes Zoloft 50 mg daily, Jardiance 10 mg daily, Basaglar 10 units 
subcu b.i.d.



ALLERGIES:  NKDA. 



SOCIAL HISTORY: He is a smoker, denies alcohol or drug use. He is employed. 



FAMILY HISTORY: Noncontributory. 



REVIEW OF SYSTEMS: GENERAL: No fever or chills. HEENT: No sore throat or difficulty 
swallowing. RESPIRATORY: No cough. No shortness of breath. CVS: No chest pain. No 
peripheral edema. No syncope. GI: Positive for nausea, vomiting. Denies abdominal pain. No 
diarrhea or constipation. SKIN: No rash. No lesions. NEUROLOGIC: He has had some frequent 
headaches. Feels confused and has had some dizziness over the preceding several days. No 
focal weakness, numbness or paresthesia. The remainder of systems reviewed on presentation 
was found to be negative. 



PHYSICAL EXAMINATION: Temperature 97.7F, pulse 90, blood pressure 116/76, respiratory rate 
13. Oxygen saturation 99% on room air. 

GENERAL:  He is alert, seen in his room with the lights out wearing sun glasses. He had 
some morphine for his headache so he is slightly lethargic but answers questions and 
responds appropriately, cooperative.  

HEENT: Head - Normocephalic, atraumatic.  

NECK: Supple without lymphadenopathy. 

CVS: Regular rate and rhythm. No murmur. No gallop. No rub. 

RESPIRATORY: Clear to auscultation bilaterally.

ABDOMEN: Soft, nontender, nondistended.  

SKIN:  Pink, warm and dry. 

EXTREMITIES: No clubbing. No cyanosis. No edema. 

NEUROLOGIC: No slurred speech. No facial droop. Moves all extremities equally. Sensation 
intact diffusely. No focal neurological deficits present. 



LAB DATA AND TESTS:  White blood cell count 9,100, hemoglobin 18.2, PLT count 302,000. 
Venous blood gas pH 7.00, pCO2 of 33. Bicarbonate 8.1. Chemistry: Sodium 141, potassium 
5.2, chloride 100, carbon dioxide 8, BUN 21, creatinine 1.38, glucose 278. UA shows a 
large amount of ketones with some proteinuria. Urine drug screen negative. Salicylates, 
acetaminophen and ethanol alcohol are negative.  On review of his previous record, head CT 
on 09/02/2018 showed no acute intracranial abnormality. 



ASSESSMENT:  Jigar Rubio is a 52 year-old male with previous history of diabetes 
mellitus type 2 who now presents with diabetic ketoacidosis with anion gap present. At 
this time due to the mild to moderate elevation of glucose he will be receiving frequent 
subcutaneous insulin injections. He has received 2 liters of normal saline here in the 
emergency department. Therefore I will follow his anion gap with serial BMP's. He will 
likely need an increase in his basal insulin and may need consider addition of 
short-acting subcutaneous insulin with needles. I am going to stop his Jardiance due to 
this episode of diabetic ketoacidosis. I feel as though his neurological symptoms are 
likely related to the diabetic ketoacidosis so we will see how he improves with treatment 
at this time. Again, the previous work up was negative in the emergency department and 
those records have been reviewed today.

## 2018-09-26 NOTE — ERPHSYRPT
- History of Present Illness


Time Seen by Provider: 09/26/18 07:09


Source: patient


Exam Limitations: no limitations


Patient Subjective Stated Complaint: Pt arrives to ER with c/o vomiting once 

last night and twice this morning. States had blood sugar related seizure on 9/2 /18, is DMII. Generlized weakness, fatigue. Concerned for dehydration.


Triage Nursing Assessment: see above


Physician History: 





52-year-old white male arrives with complaint of nausea and vomiting since 

yesterday apparently had been seen here September 2, 2018 secondary to a 

seizure.  The patient's wife states that he has been vomiting intermittently 

since he's been working with his family  secondary to this. the patient 

complains of general malaise.




















past medical history is positive for copd and Diabetes





past surgical history is negative.





social history positive for  tobacco use





Timing/Duration: yesterday


Severity: moderate


Modifying Factors: Improves With: nothing


Associated Symptoms: nausea, vomiting, malaise, No abdominal pain, No shortness 

of breath, No heartburn, No diaphoresis, No cough, No chills, No chest pain, No 

fever, No headaches, No loss of appetite, No rash, No syncope, No seizure, No 

weakness


Allergies/Adverse Reactions: 








No Known Drug Allergies Allergy (Verified 09/26/18 06:30)


 





Home Medications: 








Metformin HCl 1000 mg [Glucophage 1000 MG] 500 mg PO BID 04/03/15 [History]


Empagliflozin [Jardiance] 10 mg PO DAILY 09/26/18 [History]


Insulin Glargine,Hum.rec.anlog [Basaglar Kwikpen U-100] 10 unit SQ 09/26/18 [

History]


Promethazine HCl 25 mg*** [Phenergan 25 mg***] 25 mg PO 09/26/18 [History]


Sertraline HCl 50 mg** [Zoloft 50 mg Tablet**] 50 mg PO DAILY 09/26/18 [History]





Hx Tetanus, Diphtheria Vaccination/Date Given: Yes


Hx Influenza Vaccination/Date Given: No


Hx Pneumococcal Vaccination/Date Given: No





- Review of Systems


Constitutional: Malaise


Eyes: No Symptoms


Ears, Nose, & Throat: No Symptoms


Respiratory: No Cough, No Dyspnea


Cardiac: No Chest Pain, No Edema, No Syncope


Abdominal/Gastrointestinal: Nausea, Vomiting, No Abdominal Pain, No Diarrhea, 

No Constipation, No Hematemesis, No Hematochezia, No Melena, No Dysphagia, No 

Appetite Changes


Genitourinary Symptoms: No Dysuria


Musculoskeletal: No Back Pain, No Neck Pain


Skin: No Rash


Neurological: No Dizziness, No Focal Weakness, No Sensory Changes


Psychological: No Symptoms


Endocrine: No Symptoms


All Other Systems: Reviewed and Negative





- Past Medical History


Pertinent Past Medical History: Yes


Endocrine Medical History: Diabetes Type II





- Past Surgical History


Past Surgical History: No





- Social History


Smoking Status: Current every day smoker


How long have you smoked: 35 yrs


Exposure to second hand smoke: No


Drug Use: none


Patient Lives Alone: No





- Nursing Vital Signs


Nursing Vital Signs: 


 Initial Vital Signs











Pulse Rate  99 H  09/26/18 06:20


 


Blood Pressure  125/89   09/26/18 06:20


 


O2 Sat by Pulse Oximetry  95   09/26/18 06:20








 Pain Scale











Pain Intensity                 6

















- Physical Exam


General Appearance: moderate distress, alert, No no apparent distress, No 

lethargy, No cachetic, No obese, No thin


Eye Exam: PERRL/EOMI, eyes nml inspection


Ears, Nose, Throat Exam: normal ENT inspection, TMs normal, pharynx normal, 

moist mucous membranes


Neck Exam: normal inspection, non-tender, supple, full range of motion


Respiratory Exam: normal breath sounds, lungs clear, No respiratory distress


Cardiovascular Exam: regular rate/rhythm, normal heart sounds, normal 

peripheral pulses, capillary refill <2 sec


Gastrointestinal/Abdomen Exam: soft, normal bowel sounds, No tenderness, No mass


Back Exam: normal inspection, normal range of motion, No CVA tenderness, No 

vertebral tenderness


Extremity Exam: normal inspection, normal range of motion, pelvis stable


Neurologic Exam: alert, oriented x 3, cooperative, CNs II-XII nml as tested, 

normal mood/affect, nml cerebellar function, nml station & gait, sensation nml, 

No motor deficits


Skin Exam: normal color, warm, dry, No rash


**SpO2 Interpretation**: normal


Oxygen Delivery: Room Air





- Course


Nursing assessment & vital signs reviewed: Yes


EKG Interpreted by Me: RATE (92 bpm), Sinus Rhythm, NORMAL AXIS, Other (EKG: 

Sinus rhythm, 92 beats per minute, normal axis, no acute ST or T wavechanges, 

normal EKG)


Ordered Tests: 


 Active Orders 24 hr











 Category Date Time Status


 


 Accucheck STAT Care  09/26/18 06:37 Active


 


 Accucheck STAT Care  09/26/18 08:13 Active


 


 Cardiac Monitor STAT Care  09/26/18 06:39 Active


 


 EKG-ER Only STAT Care  09/26/18 06:37 Active


 


 IV Insertion STAT Care  09/26/18 06:37 Active


 


 ACETAMINOPHEN Stat Lab  09/26/18 06:30 Completed


 


 AMYLASE Stat Lab  09/26/18 06:30 Completed


 


 BLOOD CULTURE Stat Lab  09/26/18 06:10 Received


 


 CBC W DIFF Stat Lab  09/26/18 06:35 Completed


 


 CMP Stat Lab  09/26/18 06:35 Completed


 


 CULTURE,URINE Stat Lab  09/26/18 07:16 Received


 


 ETHYL ALCOHOL Stat Lab  09/26/18 06:30 Completed


 


 LIPASE Stat Lab  09/26/18 06:30 Completed


 


 SALICYLATE Stat Lab  09/26/18 06:30 Completed


 


 UA W/ MICROSCOPIC Stat Lab  09/26/18 07:16 Completed


 


 Urine Triage Profile Stat Lab  09/26/18 07:16 Completed


 


 VENOUS BLOOD GAS Urgent Lab  09/26/18 07:30 Completed








Medication Summary











Generic Name Dose Route Start Last Admin





  Trade Name Freq  PRN Reason Stop Dose Admin


 


Sodium Chloride  1,000 mls @ 150 mls/hr  09/26/18 08:30  09/26/18 08:36





  Sodium Chloride 0.9% 1000 Ml  IV  10/26/18 08:29  150 mls/hr





  .Q6H40M SAURABH   Administration





     





     





     





     














Discontinued Medications














Generic Name Dose Route Start Last Admin





  Trade Name Freq  PRN Reason Stop Dose Admin


 


Sodium Chloride  1,000 mls @ 999 mls/hr  09/26/18 06:37  09/26/18 06:40





  Sodium Chloride 0.9% 1000 Ml  IV  09/26/18 07:37  999 mls/hr





  .Q1H1M STA   Administration





     





     





     





     


 


Sodium Chloride  Confirm  09/26/18 06:40  





  Sodium Chloride 0.9% 1000 Ml  Administered  09/26/18 06:41  





  Dose   





  1,000 mls @ ud   





  .ROUTE   





  .STK-MED ONE   





     





     





     





     


 


Sodium Chloride  1,000 mls @ 999 mls/hr  09/26/18 07:11  09/26/18 07:46





  Sodium Chloride 0.9% 1000 Ml  IV  09/26/18 08:11  999 mls/hr





  .Q1H1M STA   Administration





     





     





     





     


 


Sodium Chloride  Confirm  09/26/18 07:44  





  Sodium Chloride 0.9% 1000 Ml  Administered  09/26/18 07:45  





  Dose   





  1,000 mls @ ud   





  .ROUTE   





  .STK-MED ONE   





     





     





     





     


 


Morphine Sulfate  4 mg  09/26/18 08:11  09/26/18 08:14





  Morphine Sulfate 4 Mg Inj***  IV  09/26/18 08:12  4 mg





  STAT ONE   Administration





     





     





     





     


 


Morphine Sulfate  Confirm  09/26/18 08:13  





  Morphine Sulfate 4 Mg Inj***  Administered  09/26/18 08:14  





  Dose   





  4 mg   





  .ROUTE   





  .STK-MED ONE   





     





     





     





     


 


Ondansetron HCl  4 mg  09/26/18 07:43  09/26/18 07:46





  Zofran 4 Mg/2 Ml Vial**  IV  09/26/18 07:44  4 mg





  STAT ONE   Administration





     





     





     





     


 


Ondansetron HCl  Confirm  09/26/18 07:43  





  Zofran 4 Mg/2 Ml Vial**  Administered  09/26/18 07:44  





  Dose   





  4 mg   





  .ROUTE   





  .STK-MED ONE   





     





     





     





     











Lab/Rad Data: 


 Laboratory Result Diagrams





 09/26/18 06:35 





 09/26/18 06:35 





 Laboratory Results











  09/26/18 09/26/18 09/26/18 Range/Units





  07:30 07:16 07:16 


 


WBC     (4.0-10.5)  K/mm3


 


RBC     (4.1-5.6)  M/mm3


 


Hgb     (12.5-18.0)  gm/dl


 


Hct     (42-50)  %


 


MCV     ()  fl


 


MCH     (26-32)  pg


 


MCHC     (32-36)  g/dl


 


RDW     (11.5-14.0)  %


 


Plt Count     (150-450)  K/mm3


 


MPV     (6-9.5)  fl


 


Gran %     (36.0-66.0)  %


 


Eos # (Auto)     (0-0.5)  


 


Absolute Lymphs (auto)     (1.0-4.6)  


 


Absolute Monos (auto)     (0.0-1.3)  


 


Lymphocytes %     (24.0-44.0)  %


 


Monocytes %     (0.0-12.0)  %


 


Eosinophils %     (0.00-5.0)  %


 


Basophils %     (0.0-0.4)  %


 


Absolute Granulocytes     (1.4-6.9)  


 


Basophils #     (0-0.4)  


 


pO2/FiO2 Ratio  21.0    %


 


VBG pH  7.00 L*    (7.32-7.42)  


 


VBG pCO2 at Pat Temp  33 L    (42-55)  mm/Hg


 


VBG pO2 at Pat Temp  29    (25-40)  mm/Hg


 


VBG HCO3  8.1 L*    (22-28)  meq/L


 


VBG O2 Sat (Carol)  52.1 L    ()  


 


VBG Base Excess  -22.5 L    (-2.0-2.0)  


 


VBG Hemoglobin  16.6    


 


VBG Carboxyhemoglobin  2.0    (0.0-6.9)  % T HGB


 


POC Potassium  5.7 H    (3.5-5.1)  


 


Sodium     (137-145)  mmol/L


 


Potassium     (3.5-5.1)  mmol/L


 


Chloride     ()  mmol/L


 


Carbon Dioxide     (22-30)  mmol/L


 


Anion Gap     (5-15)  MEQ/L


 


BUN     (9-20)  mg/dL


 


Creatinine     (0.66-1.25)  mg/dL


 


Estimated GFR     ML/MIN


 


Glucose     ()  mg/dL


 


Calcium     (8.4-10.2)  mg/dL


 


Total Bilirubin     (0.2-1.3)  mg/dL


 


AST     (17-59)  U/L


 


ALT     (0-50)  U/L


 


Alkaline Phosphatase     ()  U/L


 


Serum Total Protein     (6.3-8.2)  g/dL


 


Albumin     (3.5-5.0)  g/dL


 


Amylase     ()  U/L


 


Lipase     ()  U/L


 


Ur Collection Type    VOID  


 


Urine Color    YELLOW  (YELLOW)  


 


Urine Appearance    CLEAR  (CLEAR)  


 


Urine pH    5.0  (5-6)  


 


Ur Specific Gravity    1.030  (1.005-1.025)  


 


Urine Protein    100  (Negative)  


 


Urine Ketones    LARGE  (NEGATIVE)  


 


Urine Blood    5-10  (0-5)  Ernie/ul


 


Urine Nitrite    NEGATIVE  (NEGATIVE)  


 


Urine Bilirubin    NEGATIVE  (NEGATIVE)  


 


Urine Urobilinogen    NORMAL  (0-1)  mg/dL


 


Ur Leukocyte Esterase    NEGATIVE  (NEGATIVE)  


 


Urine Microscopic RBC    5-10  (0-2)  /HPF


 


Urine Microscopic WBC    0-2  (0-5)  /HPF


 


Ur Epithelial Cells    FEW  (FEW)  /HPF


 


Urine Bacteria    RARE  (NEGATIVE)  /HPF


 


Hyaline Casts    0-2  (0-2)  /LPF


 


Urine Culture Reflexed    YES  (NO)  


 


Urine Glucose    1000  (NEGATIVE)  mg/dL


 


Salicylates     (2-20)  mg/dL


 


Urine Opiates Level   NEGATIVE   (NEGATIVE)  


 


Ur Methadone   NEGATIVE   (NEGATIVE)  


 


Acetaminophen     (10-30)  ug/ml


 


Urine Barbiturates   NEGATIVE   (NEGATIVE)  


 


Ur Phencyclidine (PCP)   NEGATIVE   (NEGATIVE)  


 


Urine Amphetamine   NEGATIVE   (NEGATIVE)  


 


U Benzodiazepine Level   NEGATIVE   (NEGATIVE)  


 


Urine Cocaine   NEGATIVE   (NEGATIVE)  


 


Urine Marijuana (THC)   NEGATIVE   (NEGATIVE)  


 


Ethyl Alcohol     (0-10)  mg/dL














  09/26/18 09/26/18 09/26/18 Range/Units





  06:35 06:35 06:30 


 


WBC   9.1   (4.0-10.5)  K/mm3


 


RBC   5.71 H   (4.1-5.6)  M/mm3


 


Hgb   18.2 H   (12.5-18.0)  gm/dl


 


Hct   52.7 H   (42-50)  %


 


MCV   92.3   ()  fl


 


MCH   31.8   (26-32)  pg


 


MCHC   34.5   (32-36)  g/dl


 


RDW   12.5   (11.5-14.0)  %


 


Plt Count   302   (150-450)  K/mm3


 


MPV   11.7 H   (6-9.5)  fl


 


Gran %   81.3 H   (36.0-66.0)  %


 


Eos # (Auto)   0.02   (0-0.5)  


 


Absolute Lymphs (auto)   1.45   (1.0-4.6)  


 


Absolute Monos (auto)   0.20   (0.0-1.3)  


 


Lymphocytes %   16.0 L   (24.0-44.0)  %


 


Monocytes %   2.2   (0.0-12.0)  %


 


Eosinophils %   0.2   (0.00-5.0)  %


 


Basophils %   0.3   (0.0-0.4)  %


 


Absolute Granulocytes   7.39 H   (1.4-6.9)  


 


Basophils #   0.03   (0-0.4)  


 


pO2/FiO2 Ratio     %


 


VBG pH     (7.32-7.42)  


 


VBG pCO2 at Pat Temp     (42-55)  mm/Hg


 


VBG pO2 at Pat Temp     (25-40)  mm/Hg


 


VBG HCO3     (22-28)  meq/L


 


VBG O2 Sat (Carol)     ()  


 


VBG Base Excess     (-2.0-2.0)  


 


VBG Hemoglobin     


 


VBG Carboxyhemoglobin     (0.0-6.9)  % T HGB


 


POC Potassium     (3.5-5.1)  


 


Sodium  141    (137-145)  mmol/L


 


Potassium  5.2 H    (3.5-5.1)  mmol/L


 


Chloride  100    ()  mmol/L


 


Carbon Dioxide  8 L*    (22-30)  mmol/L


 


Anion Gap  37.7 H    (5-15)  MEQ/L


 


BUN  21 H    (9-20)  mg/dL


 


Creatinine  1.38 H    (0.66-1.25)  mg/dL


 


Estimated GFR  57.5    ML/MIN


 


Glucose  278 H    ()  mg/dL


 


Calcium  10.1    (8.4-10.2)  mg/dL


 


Total Bilirubin  0.50    (0.2-1.3)  mg/dL


 


AST  22    (17-59)  U/L


 


ALT  24    (0-50)  U/L


 


Alkaline Phosphatase  167 H    ()  U/L


 


Serum Total Protein  9.7 H    (6.3-8.2)  g/dL


 


Albumin  5.6 H    (3.5-5.0)  g/dL


 


Amylase     ()  U/L


 


Lipase     ()  U/L


 


Ur Collection Type     


 


Urine Color     (YELLOW)  


 


Urine Appearance     (CLEAR)  


 


Urine pH     (5-6)  


 


Ur Specific Gravity     (1.005-1.025)  


 


Urine Protein     (Negative)  


 


Urine Ketones     (NEGATIVE)  


 


Urine Blood     (0-5)  Ernie/ul


 


Urine Nitrite     (NEGATIVE)  


 


Urine Bilirubin     (NEGATIVE)  


 


Urine Urobilinogen     (0-1)  mg/dL


 


Ur Leukocyte Esterase     (NEGATIVE)  


 


Urine Microscopic RBC     (0-2)  /HPF


 


Urine Microscopic WBC     (0-5)  /HPF


 


Ur Epithelial Cells     (FEW)  /HPF


 


Urine Bacteria     (NEGATIVE)  /HPF


 


Hyaline Casts     (0-2)  /LPF


 


Urine Culture Reflexed     (NO)  


 


Urine Glucose     (NEGATIVE)  mg/dL


 


Salicylates     (2-20)  mg/dL


 


Urine Opiates Level     (NEGATIVE)  


 


Ur Methadone     (NEGATIVE)  


 


Acetaminophen     (10-30)  ug/ml


 


Urine Barbiturates     (NEGATIVE)  


 


Ur Phencyclidine (PCP)     (NEGATIVE)  


 


Urine Amphetamine     (NEGATIVE)  


 


U Benzodiazepine Level     (NEGATIVE)  


 


Urine Cocaine     (NEGATIVE)  


 


Urine Marijuana (THC)     (NEGATIVE)  


 


Ethyl Alcohol    < 10  (0-10)  mg/dL














  09/26/18 Range/Units





  06:30 


 


WBC   (4.0-10.5)  K/mm3


 


RBC   (4.1-5.6)  M/mm3


 


Hgb   (12.5-18.0)  gm/dl


 


Hct   (42-50)  %


 


MCV   ()  fl


 


MCH   (26-32)  pg


 


MCHC   (32-36)  g/dl


 


RDW   (11.5-14.0)  %


 


Plt Count   (150-450)  K/mm3


 


MPV   (6-9.5)  fl


 


Gran %   (36.0-66.0)  %


 


Eos # (Auto)   (0-0.5)  


 


Absolute Lymphs (auto)   (1.0-4.6)  


 


Absolute Monos (auto)   (0.0-1.3)  


 


Lymphocytes %   (24.0-44.0)  %


 


Monocytes %   (0.0-12.0)  %


 


Eosinophils %   (0.00-5.0)  %


 


Basophils %   (0.0-0.4)  %


 


Absolute Granulocytes   (1.4-6.9)  


 


Basophils #   (0-0.4)  


 


pO2/FiO2 Ratio   %


 


VBG pH   (7.32-7.42)  


 


VBG pCO2 at Pat Temp   (42-55)  mm/Hg


 


VBG pO2 at Pat Temp   (25-40)  mm/Hg


 


VBG HCO3   (22-28)  meq/L


 


VBG O2 Sat (Carol)   ()  


 


VBG Base Excess   (-2.0-2.0)  


 


VBG Hemoglobin   


 


VBG Carboxyhemoglobin   (0.0-6.9)  % T HGB


 


POC Potassium   (3.5-5.1)  


 


Sodium   (137-145)  mmol/L


 


Potassium   (3.5-5.1)  mmol/L


 


Chloride   ()  mmol/L


 


Carbon Dioxide   (22-30)  mmol/L


 


Anion Gap   (5-15)  MEQ/L


 


BUN   (9-20)  mg/dL


 


Creatinine   (0.66-1.25)  mg/dL


 


Estimated GFR   ML/MIN


 


Glucose   ()  mg/dL


 


Calcium   (8.4-10.2)  mg/dL


 


Total Bilirubin   (0.2-1.3)  mg/dL


 


AST   (17-59)  U/L


 


ALT   (0-50)  U/L


 


Alkaline Phosphatase   ()  U/L


 


Serum Total Protein   (6.3-8.2)  g/dL


 


Albumin   (3.5-5.0)  g/dL


 


Amylase  53  ()  U/L


 


Lipase  41  ()  U/L


 


Ur Collection Type   


 


Urine Color   (YELLOW)  


 


Urine Appearance   (CLEAR)  


 


Urine pH   (5-6)  


 


Ur Specific Gravity   (1.005-1.025)  


 


Urine Protein   (Negative)  


 


Urine Ketones   (NEGATIVE)  


 


Urine Blood   (0-5)  Ernie/ul


 


Urine Nitrite   (NEGATIVE)  


 


Urine Bilirubin   (NEGATIVE)  


 


Urine Urobilinogen   (0-1)  mg/dL


 


Ur Leukocyte Esterase   (NEGATIVE)  


 


Urine Microscopic RBC   (0-2)  /HPF


 


Urine Microscopic WBC   (0-5)  /HPF


 


Ur Epithelial Cells   (FEW)  /HPF


 


Urine Bacteria   (NEGATIVE)  /HPF


 


Hyaline Casts   (0-2)  /LPF


 


Urine Culture Reflexed   (NO)  


 


Urine Glucose   (NEGATIVE)  mg/dL


 


Salicylates  < 1.0 L  (2-20)  mg/dL


 


Urine Opiates Level   (NEGATIVE)  


 


Ur Methadone   (NEGATIVE)  


 


Acetaminophen  < 10 L  (10-30)  ug/ml


 


Urine Barbiturates   (NEGATIVE)  


 


Ur Phencyclidine (PCP)   (NEGATIVE)  


 


Urine Amphetamine   (NEGATIVE)  


 


U Benzodiazepine Level   (NEGATIVE)  


 


Urine Cocaine   (NEGATIVE)  


 


Urine Marijuana (THC)   (NEGATIVE)  


 


Ethyl Alcohol   (0-10)  mg/dL














- Progress


Progress: improved


Progress Note: 





09/26/18 08:45


52-year-old white male with history of COPD, diabetes mellitus.





Patient arrives with complaint of vomiting a general malaise





Patient had been seen on September 2, 2018 secondary to a seizure at that time 

he had actually dislodged  a tooth  it was thought possibly the patient could'

ve had a hypoglycemic episode.





Patient arrives with complaint of nausea and vomiting for the past 2 days his 

wife states his blood sugars have been elevated.


He has general malaise.





Patient is afebrile


Patient with a blood sugar in the 290s on arrival


EKG on this patient is remarkable for normal sinus rhythm 92 bpm normal axis no 

acute ST or T wave changes essentially normal EKG





Chemistry shows a glucose of 278 sodium 141 potassium 5.2 chloride 100 CO2 is 8 

BUN 21 creatinine 1.38 anion gap is elevated at 37.7 patient's venous gases 

show a pH of 7





Patient's urine shows 1000 glucose and a large amount of ketones and specific 

gravity 1.030 pH 5.0 CBC white count 9.1 hemoglobin 18.2 hematocrit 52.7 

platelets are 302





Patient is given 2 L of normal saline Accu-Chek shows a glucose around 260





Patient was given morphine 4 mg were headache and Zofran 4 mg for nausea and 

vomiting.





Patient has been placed on normal saline at 150 mL per hour.





I've discussed the case with Dr. Miguel.


Will place patient on telemetry observation.


Will place patient on sliding scale insulin coverage with every 2 hour Accu-

Cheks.








 impression diabetic ketoacidosis, nausea and vomiting.





- Departure


Time of Disposition: 08:50


Departure Disposition: Observation


Clinical Impression: 


 Vomiting





Diabetic ketoacidosis


Qualifiers:


 Diabetes mellitus type: type 2 Diabetes mellitus complication detail: without 

coma Qualified Code(s): E11.10 - Type 2 diabetes mellitus with ketoacidosis 

without coma





Condition: Fair


Critical Care Time: No

## 2018-09-27 LAB
ALBUMIN SERPL-MCNC: 3.9 G/DL (ref 3.5–5)
ALP SERPL-CCNC: 96 U/L (ref 38–126)
ALT SERPL-CCNC: 13 U/L (ref 0–50)
ANION GAP SERPL CALC-SCNC: 13.3 MEQ/L (ref 5–15)
ANION GAP SERPL CALC-SCNC: 13.4 MEQ/L (ref 5–15)
AST SERPL QL: 12 U/L (ref 17–59)
BASOPHILS # BLD AUTO: 0.01 10*3/UL (ref 0–0.4)
BASOPHILS NFR BLD AUTO: 0.1 % (ref 0–0.4)
BILIRUB BLD-MCNC: 0.3 MG/DL (ref 0.2–1.3)
BUN SERPL-MCNC: 14 MG/DL (ref 9–20)
BUN SERPL-MCNC: 17 MG/DL (ref 9–20)
CALCIUM SPEC-MCNC: 8.4 MG/DL (ref 8.4–10.2)
CALCIUM SPEC-MCNC: 8.7 MG/DL (ref 8.4–10.2)
CHLORIDE SERPL-SCNC: 107 MMOL/L (ref 98–107)
CHLORIDE SERPL-SCNC: 108 MMOL/L (ref 98–107)
CO2 SERPL-SCNC: 18 MMOL/L (ref 22–30)
CO2 SERPL-SCNC: 19 MMOL/L (ref 22–30)
CREAT SERPL-MCNC: 0.85 MG/DL (ref 0.66–1.25)
CREAT SERPL-MCNC: 0.9 MG/DL (ref 0.66–1.25)
EOSINOPHIL # BLD AUTO: 0.02 10*3/UL (ref 0–0.5)
GLUCOSE SERPL-MCNC: 126 MG/DL (ref 74–106)
GLUCOSE SERPL-MCNC: 136 MG/DL (ref 74–106)
GRANULOCYTES # BLD AUTO: 7.62 10*3/UL (ref 1.4–6.9)
HCT VFR BLD AUTO: 40.1 % (ref 42–50)
HGB BLD-MCNC: 14.5 GM/DL (ref 12.5–18)
LYMPHOCYTES # SPEC AUTO: 2.36 10*3/UL (ref 1–4.6)
MCH RBC QN AUTO: 32.3 PG (ref 26–32)
MCHC RBC AUTO-ENTMCNC: 36.2 G/DL (ref 32–36)
MONOCYTES # BLD AUTO: 0.72 10*3/UL (ref 0–1.3)
NEUTROPHILS NFR BLD AUTO: 71 % (ref 36–66)
PHOSPHATE SERPL-SCNC: 2 MG/DL (ref 2.5–4.5)
PLATELET # BLD AUTO: 202 K/MM3 (ref 150–450)
POTASSIUM SERPLBLD-SCNC: 3.6 MMOL/L (ref 3.5–5.1)
POTASSIUM SERPLBLD-SCNC: 4 MMOL/L (ref 3.5–5.1)
PROT SERPL-MCNC: 6.8 G/DL (ref 6.3–8.2)
RBC # BLD AUTO: 4.49 M/MM3 (ref 4.1–5.6)
SODIUM SERPL-SCNC: 136 MMOL/L (ref 137–145)
SODIUM SERPL-SCNC: 136 MMOL/L (ref 137–145)
WBC # BLD AUTO: 10.7 K/MM3 (ref 4–10.5)

## 2018-09-27 RX ADMIN — MORPHINE SULFATE PRN MG: 4 INJECTION, SOLUTION INTRAMUSCULAR; INTRAVENOUS at 15:01

## 2018-09-27 RX ADMIN — HYDROCODONE BITARTRATE AND ACETAMINOPHEN PRN TAB: 5; 325 TABLET ORAL at 10:29

## 2018-09-27 RX ADMIN — INSULIN ASPART SCH UNIT: 100 INJECTION, SOLUTION INTRAVENOUS; SUBCUTANEOUS at 16:48

## 2018-09-27 RX ADMIN — INSULIN ASPART SCH UNIT: 100 INJECTION, SOLUTION INTRAVENOUS; SUBCUTANEOUS at 09:32

## 2018-09-27 RX ADMIN — HYDROCODONE BITARTRATE AND ACETAMINOPHEN PRN TAB: 5; 325 TABLET ORAL at 22:28

## 2018-09-27 RX ADMIN — INSULIN GLARGINE SCH UNIT: 100 INJECTION, SOLUTION SUBCUTANEOUS at 09:32

## 2018-09-27 RX ADMIN — ONDANSETRON PRN MG: 2 INJECTION, SOLUTION INTRAMUSCULAR; INTRAVENOUS at 19:24

## 2018-09-27 RX ADMIN — ONDANSETRON PRN MG: 2 INJECTION, SOLUTION INTRAMUSCULAR; INTRAVENOUS at 10:29

## 2018-09-27 RX ADMIN — INSULIN HUMAN PRN MLS/HR: 100 INJECTION, SOLUTION PARENTERAL at 01:06

## 2018-09-27 RX ADMIN — INSULIN ASPART SCH UNIT: 100 INJECTION, SOLUTION INTRAVENOUS; SUBCUTANEOUS at 12:29

## 2018-09-27 RX ADMIN — ONDANSETRON PRN MG: 2 INJECTION, SOLUTION INTRAMUSCULAR; INTRAVENOUS at 15:01

## 2018-09-27 RX ADMIN — MORPHINE SULFATE PRN MG: 4 INJECTION, SOLUTION INTRAMUSCULAR; INTRAVENOUS at 06:18

## 2018-09-27 RX ADMIN — SERTRALINE SCH MG: 50 TABLET, FILM COATED ORAL at 22:28

## 2018-09-27 RX ADMIN — ONDANSETRON PRN MG: 2 INJECTION, SOLUTION INTRAMUSCULAR; INTRAVENOUS at 06:18

## 2018-09-27 RX ADMIN — INSULIN GLARGINE SCH UNIT: 100 INJECTION, SOLUTION SUBCUTANEOUS at 22:27

## 2018-09-27 RX ADMIN — MORPHINE SULFATE PRN MG: 4 INJECTION, SOLUTION INTRAMUSCULAR; INTRAVENOUS at 19:24

## 2018-09-28 VITALS — SYSTOLIC BLOOD PRESSURE: 119 MMHG | HEART RATE: 68 BPM | DIASTOLIC BLOOD PRESSURE: 77 MMHG | OXYGEN SATURATION: 99 %

## 2018-09-28 LAB
ANION GAP SERPL CALC-SCNC: 7.9 MEQ/L (ref 5–15)
BASOPHILS # BLD AUTO: 0.02 10*3/UL (ref 0–0.4)
BASOPHILS NFR BLD AUTO: 0.4 % (ref 0–0.4)
BUN SERPL-MCNC: 10 MG/DL (ref 9–20)
CALCIUM SPEC-MCNC: 8.3 MG/DL (ref 8.4–10.2)
CHLORIDE SERPL-SCNC: 112 MMOL/L (ref 98–107)
CO2 SERPL-SCNC: 25 MMOL/L (ref 22–30)
CREAT SERPL-MCNC: 0.75 MG/DL (ref 0.66–1.25)
EOSINOPHIL # BLD AUTO: 0.1 10*3/UL (ref 0–0.5)
GLUCOSE SERPL-MCNC: 96 MG/DL (ref 74–106)
GRANULOCYTES # BLD AUTO: 1.96 10*3/UL (ref 1.4–6.9)
HCT VFR BLD AUTO: 34.6 % (ref 42–50)
HGB BLD-MCNC: 12.2 GM/DL (ref 12.5–18)
LYMPHOCYTES # SPEC AUTO: 2.27 10*3/UL (ref 1–4.6)
MCH RBC QN AUTO: 32.1 PG (ref 26–32)
MCHC RBC AUTO-ENTMCNC: 35.3 G/DL (ref 32–36)
MONOCYTES # BLD AUTO: 0.25 10*3/UL (ref 0–1.3)
NEUTROPHILS NFR BLD AUTO: 42.7 % (ref 36–66)
PLATELET # BLD AUTO: 167 K/MM3 (ref 150–450)
POTASSIUM SERPLBLD-SCNC: 3.5 MMOL/L (ref 3.5–5.1)
RBC # BLD AUTO: 3.8 M/MM3 (ref 4.1–5.6)
SODIUM SERPL-SCNC: 141 MMOL/L (ref 137–145)
WBC # BLD AUTO: 4.6 K/MM3 (ref 4–10.5)

## 2018-09-28 RX ADMIN — INSULIN GLARGINE SCH UNIT: 100 INJECTION, SOLUTION SUBCUTANEOUS at 09:51

## 2018-09-28 RX ADMIN — INSULIN ASPART SCH UNIT: 100 INJECTION, SOLUTION INTRAVENOUS; SUBCUTANEOUS at 07:53

## 2018-09-28 RX ADMIN — INSULIN ASPART SCH UNIT: 100 INJECTION, SOLUTION INTRAVENOUS; SUBCUTANEOUS at 11:37

## 2018-09-28 NOTE — PCM.DS
Discharge Summary


Date of Admission: 


09/26/18 09:06





Admitting Physician: 


XIN VERDUGO





Primary Care Provider: 


NO FAMILY DOCTOR








Allergies


Allergies





No Known Drug Allergies Allergy (Verified 09/26/18 06:30)


 











Hospital Summary





- Hospital Course


Hospital Course: 





patient was admitted with DKA, doing well at this time. gap has closed and he 

had been rehydrated, discussed discharge medication changes





- Vitals & Intake/Output


Vital Signs: 





 Vital Signs











Temperature  97.7 F   09/28/18 06:52


 


Pulse Rate  63   09/28/18 06:52


 


Respiratory Rate  12   09/28/18 06:52


 


Blood Pressure  120/73   09/28/18 06:52


 


O2 Sat by Pulse Oximetry  97   09/28/18 06:52











Intake & Output: 





 Intake & Output











 09/25/18 09/26/18 09/27/18 09/28/18





 11:59 11:59 11:59 11:59


 


Intake Total   6123 5273


 


Output Total  300 2100 1700


 


Balance  -300 4023 3573


 


Weight  80.8 kg 86.1 kg 83.7 kg














- Lab


Result Diagrams: 


 09/28/18 05:27





 09/28/18 05:27


Lab Results-Last 24 Hrs: 





 Accuchecks











Date                           09/28/18


 


Date                           09/28/18


 


Date                           09/27/18


 


Date                           09/27/18


 


Date                           09/27/18


 


Date                           09/27/18


 


Time                           04:00


 


Time                           00:00


 


Time                           20:00


 


Time                           16:48


 


Time                           12:29


 


Time                           09:32


 


Accucheck Value:               131


 


Accucheck Value:               204


 


Accucheck Value:               211


 


Accucheck Value:               240


 


Accucheck Value:               170


 


Accucheck Value:               213











 Lab Results-Last 24 Hours











  09/27/18 09/28/18 09/28/18 Range/Units





  08:03 05:27 05:27 


 


WBC   4.6   (4.0-10.5)  K/mm3


 


RBC   3.80 L   (4.1-5.6)  M/mm3


 


Hgb   12.2 L   (12.5-18.0)  gm/dl


 


Hct   34.6 L   (42-50)  %


 


MCV   91.1   ()  fl


 


MCH   32.1 H   (26-32)  pg


 


MCHC   35.3   (32-36)  g/dl


 


RDW   12.5   (11.5-14.0)  %


 


Plt Count   167   (150-450)  K/mm3


 


MPV   11.3 H   (6-9.5)  fl


 


Gran %   42.7   (36.0-66.0)  %


 


Eos # (Auto)   0.10   (0-0.5)  


 


Absolute Lymphs (auto)   2.27   (1.0-4.6)  


 


Absolute Monos (auto)   0.25   (0.0-1.3)  


 


Lymphocytes %   49.3 H   (24.0-44.0)  %


 


Monocytes %   5.4   (0.0-12.0)  %


 


Eosinophils %   2.2   (0.00-5.0)  %


 


Basophils %   0.4   (0.0-0.4)  %


 


Absolute Granulocytes   1.96   (1.4-6.9)  


 


Basophils #   0.02   (0-0.4)  


 


Sodium  136 L   141  (137-145)  mmol/L


 


Potassium  4.0   3.5  (3.5-5.1)  mmol/L


 


Chloride  107   112 H  ()  mmol/L


 


Carbon Dioxide  19 L   25  (22-30)  mmol/L


 


Anion Gap  13.3   7.9  (5-15)  MEQ/L


 


BUN  14   10  (9-20)  mg/dL


 


Creatinine  0.85   0.75  (0.66-1.25)  mg/dL


 


Estimated GFR  > 60.0   > 60.0  ML/MIN


 


Glucose  136 H   96  ()  mg/dL


 


Calcium  8.4   8.3 L  (8.4-10.2)  mg/dL











Micro Results-Entire Visit: 





 Microbiology











 09/26/18 07:50 Blood Culture - Preliminary





 Blood    NO GROWTH TO DATE


 


 09/26/18 06:10 Blood Culture - Preliminary





 Blood    NO GROWTH TO DATE


 


 09/26/18 07:16 Urine Culture - Final





 Urine, Void    NO GROWTH








 Accuchecks











Date                           09/28/18


 


Date                           09/28/18


 


Date                           09/27/18


 


Date                           09/27/18


 


Date                           09/27/18


 


Date                           09/27/18


 


Time                           04:00


 


Time                           00:00


 


Time                           20:00


 


Time                           16:48


 


Time                           12:29


 


Time                           09:32


 


Accucheck Value:               131


 


Accucheck Value:               204


 


Accucheck Value:               211


 


Accucheck Value:               240


 


Accucheck Value:               170


 


Accucheck Value:               213

















- Procedures and Test


Procedures and Tests throughout Hospitalization: 





 Therapy Orders & Screens





09/26/18 10:56


Respiratory Therapy Assessment DAILY 


   Comment: 


   Diagnosis: DKA





09/26/18 11:02


Smoking Cessation Education ONCE 


   Comment: 


   Diagnosis: DKA


   Smoking Status: Current every day smoker


   How long have you smoked: 35 yrs


   Do you dip or chew tobacco: No














Discharge Exam


General Appearance: no apparent distress, alert


Skin Exam: normal color, warm, dry


Eye Exam: PERRL, EOMI, eyes nml inspection


Respiratory Exam: normal breath sounds, lungs clear, No respiratory distress


Cardiovascular Exam: regular rate/rhythm, normal heart sounds


Gastrointestinal/Abdomen Exam: soft, No tenderness, No mass


Extremity Exam: normal inspection, normal range of motion





Final Diagnosis/Problem List





- Final Discharge Diagnosis/Problem


(1) Diabetic ketoacidosis


Current Visit: Yes   Status: Acute   





(2) Vomiting


Current Visit: Yes   Status: Acute   





- Discharge


Disposition: Home, Self-Care


Condition: Good


Prescriptions: 


New


   Insulin Lispro [Humalog Kwikpen U-100] 5 unit SQ TIDAC #2 insuln.pen





Continue


   Metformin HCl 1000 mg [Glucophage 1000 MG] 500 mg PO BID


   Sertraline HCl 50 mg** [Zoloft 50 mg Tablet**] 50 mg PO HS





Changed


   Insulin Glargine,Hum.rec.anlog [Basaglar Kwikpen U-100] 15 unit SQ BID #2 

insuln.pen





Discontinued


   Empagliflozin [Jardiance] 10 mg PO DAILY


Instructions:  Diabetic Ketoacidosis (DC)


Additional Instructions: 


stop jardiance. increase basaglar to 15 units bid and add humalog 5 units sq 

with meals three times daily. check blood sugars three times daily and followup 

in the office in 1 week and bring your meter or a log of blood sugars to review.


Follow up with: 


XIN VERDUGO MD [ACTIVE STAFF] - 1 Week

## 2019-02-23 ENCOUNTER — HOSPITAL ENCOUNTER (EMERGENCY)
Dept: HOSPITAL 33 - ED | Age: 54
Discharge: HOME | End: 2019-02-23
Payer: COMMERCIAL

## 2019-02-23 VITALS — SYSTOLIC BLOOD PRESSURE: 124 MMHG | HEART RATE: 87 BPM | OXYGEN SATURATION: 98 % | DIASTOLIC BLOOD PRESSURE: 74 MMHG

## 2019-02-23 DIAGNOSIS — F41.9: ICD-10-CM

## 2019-02-23 DIAGNOSIS — E11.9: ICD-10-CM

## 2019-02-23 DIAGNOSIS — R11.2: ICD-10-CM

## 2019-02-23 DIAGNOSIS — Z79.899: ICD-10-CM

## 2019-02-23 DIAGNOSIS — E10.10: Primary | ICD-10-CM

## 2019-02-23 DIAGNOSIS — R51: ICD-10-CM

## 2019-02-23 DIAGNOSIS — R12: ICD-10-CM

## 2019-02-23 LAB
ALBUMIN SERPL-MCNC: 4.7 G/DL (ref 3.5–5)
ALP SERPL-CCNC: 104 U/L (ref 38–126)
ALT SERPL-CCNC: 19 U/L (ref 0–50)
AMYLASE SERPL-CCNC: 76 U/L (ref 30–110)
ANION GAP SERPL CALC-SCNC: 13.1 MEQ/L (ref 5–15)
ARTERIAL PATENCY WRIST A: (no result)
AST SERPL QL: 23 U/L (ref 17–59)
BASE EXCESS BLDA CALC-SCNC: 2.1 MMOL/L (ref -2–2)
BASOPHILS # BLD AUTO: 0.04 10*3/UL (ref 0–0.4)
BASOPHILS NFR BLD AUTO: 0.6 % (ref 0–0.4)
BILIRUB BLD-MCNC: 0.4 MG/DL (ref 0.2–1.3)
BUN SERPL-MCNC: 20 MG/DL (ref 9–20)
CALCIUM SPEC-MCNC: 9.6 MG/DL (ref 8.4–10.2)
CHLORIDE SERPL-SCNC: 102 MMOL/L (ref 98–107)
CO2 SERPL-SCNC: 29 MMOL/L (ref 22–30)
COHGB BLD-MCNC: 6.4 % THGB (ref 0–6.9)
CREAT SERPL-MCNC: 1 MG/DL (ref 0.66–1.25)
EOSINOPHIL # BLD AUTO: 0.16 10*3/UL (ref 0–0.5)
GAS PNL BLDA: 13.7
GAS PNL BLDA: 37 C
GLUCOSE SERPL-MCNC: 71 MG/DL (ref 74–106)
GRANULOCYTES # BLD AUTO: 3.14 10*3/UL (ref 1.4–6.9)
HCO3 BLDA-SCNC: 25.5 MMOL/L (ref 22–28)
HCT VFR BLD AUTO: 43.2 % (ref 42–50)
HGB BLD-MCNC: 14.4 GM/DL (ref 12.5–18)
INHALED O2 CONCENTRATION: 21 %
LIPASE SERPL-CCNC: 121 U/L (ref 23–300)
LYMPHOCYTES # SPEC AUTO: 2.56 10*3/UL (ref 1–4.6)
MCH RBC QN AUTO: 31.9 PG (ref 26–32)
MCHC RBC AUTO-ENTMCNC: 33.3 G/DL (ref 32–36)
METHGB MFR BLDA: 1.3 % (ref 1.4–1.5)
MONOCYTES # BLD AUTO: 0.38 10*3/UL (ref 0–1.3)
NEUTROPHILS NFR BLD AUTO: 50 % (ref 36–66)
O2 A-A PPRESDIFF RESPIRATORY: 23 MM[HG]
PCO2 BLDA: 35 MMHG (ref 35–45)
PLATELET # BLD AUTO: 213 K/MM3 (ref 150–450)
PO2 BLDA: 83 MMHG (ref 75–100)
POTASSIUM BLDA-SCNC: 3.7 MMOL/L (ref 3.5–5.1)
POTASSIUM SERPLBLD-SCNC: 3.9 MMOL/L (ref 3.5–5.1)
PROT SERPL-MCNC: 8.3 G/DL (ref 6.3–8.2)
RBC # BLD AUTO: 4.52 M/MM3 (ref 4.1–5.6)
SAO2 % BLDA FROM PO2: 0.78 %
SAO2 % BLDA: 90.7 G/DF (ref 94–100)
SAO2 % BLDA: 98.3 % (ref 95–100)
SODIUM SERPL-SCNC: 140 MMOL/L (ref 137–145)
SPECIMEN SOURCE: (no result)
WBC # BLD AUTO: 6.3 K/MM3 (ref 4–10.5)

## 2019-02-23 PROCEDURE — 96374 THER/PROPH/DIAG INJ IV PUSH: CPT

## 2019-02-23 PROCEDURE — 80053 COMPREHEN METABOLIC PANEL: CPT

## 2019-02-23 PROCEDURE — 83735 ASSAY OF MAGNESIUM: CPT

## 2019-02-23 PROCEDURE — 36415 COLL VENOUS BLD VENIPUNCTURE: CPT

## 2019-02-23 PROCEDURE — 82150 ASSAY OF AMYLASE: CPT

## 2019-02-23 PROCEDURE — 36600 WITHDRAWAL OF ARTERIAL BLOOD: CPT

## 2019-02-23 PROCEDURE — 85025 COMPLETE CBC W/AUTO DIFF WBC: CPT

## 2019-02-23 PROCEDURE — 99284 EMERGENCY DEPT VISIT MOD MDM: CPT

## 2019-02-23 PROCEDURE — 83605 ASSAY OF LACTIC ACID: CPT

## 2019-02-23 PROCEDURE — 82375 ASSAY CARBOXYHB QUANT: CPT

## 2019-02-23 PROCEDURE — 96360 HYDRATION IV INFUSION INIT: CPT

## 2019-02-23 PROCEDURE — 82803 BLOOD GASES ANY COMBINATION: CPT

## 2019-02-23 PROCEDURE — 96375 TX/PRO/DX INJ NEW DRUG ADDON: CPT

## 2019-02-23 PROCEDURE — 83690 ASSAY OF LIPASE: CPT

## 2019-02-23 NOTE — ERPHSYRPT
- History of Present Illness


Time Seen by Provider: 02/23/19 18:00


Historian: patient, family


Exam Limitations: no limitations


Patient Subjective Stated Complaint: pt here for abd pain today with nausea, 

headache, he states hes bs hav ebeen running high for 3 days now, pt is getting 

a new a pump tuesday, but blood sugar is 84 right now, he has a pump that reads 

bs


Triage Nursing Assessment: pt alert, resp easy, skin w/d/p. abd soft, no edema


Physician History: 





patient with Abd pain with N&V started within past 1-2 hrs; now with severe 

frontal HA; severe DM type 1 - had DKA and seizure last Sept; working on 

getting him  on an insulin pump; BS was 400 an hr ago now pump showing 79; no 

fever or chills; no prior episodes like this; no cough; no fever; no diarrhea; 





Timing/Duration: today, hour(s) (1-2 onset), sudden, worse


Activities at Onset: rest


Quality: cramping


Abdominal Pain Onset Location: epigastric, generalized abdomen


Pain Radiation: no radiation


Severity of Pain-Max: severe


Severity of Pain-Current: moderate


Modifying Factors: Improves With: vomiting


Associated Symptoms: headache, heartburn, nausea, vomiting


Previous symptoms: no prior history


Allergies/Adverse Reactions: 








No Known Drug Allergies Allergy (Verified 02/23/19 17:58)


 





Home Medications: 








Sertraline HCl 50 mg** [Zoloft 50 mg Tablet**] 150 mg PO HS 09/26/18 [History]


Insulin Degludec [Tresiba Flextouch U-100] 18 units DAILY 02/23/19 [History]


Insulin Lispro [Admelog] 9 units TID 02/23/19 [History]





Hx Tetanus, Diphtheria Vaccination/Date Given: Yes


Hx Influenza Vaccination/Date Given: No


Hx Pneumococcal Vaccination/Date Given: No


Immunizations Up to Date: Yes





- Review of Systems


Constitutional: No Symptoms


Eyes: Photophobia, No Eye Pain, No Eye Redness


Ears, Nose, & Throat: No Symptoms


Respiratory: No Cough, No Dyspnea, No Wheezing


Cardiac: No Chest Pain, No Palpitations, No Syncope


Abdominal/Gastrointestinal: Abdominal Pain, Nausea, Vomiting, No Diarrhea, No 

Constipation, No Hematemesis, No Hematochezia, No Melena


Genitourinary Symptoms: No Symptoms


Musculoskeletal: No Symptoms


Skin: No Symptoms


Neurological: Headache, No Dizziness, No Seizure, No Vertigo


Psychological: No Symptoms


Endocrine: No Symptoms


Hematologic/Lymphatic: No Symptoms


Immunological/Allergic: No Symptoms





- Past Medical History


Pertinent Past Medical History: Yes


Neurological History: No Pertinent History


ENT History: No Pertinent History


Cardiac History: No Pertinent History


Respiratory History: No Pertinent History


Endocrine Medical History: Diabetes Type II


Musculoskeletal History: No Pertinent History


GI Medical History: No Pertinent History


 History: No Pertinent History


Psycho-Social History: Anxiety


Male Reproductive Disorders: No Pertinent History





- Past Surgical History


Past Surgical History: No





- Social History


Smoking Status: Current every day smoker


How long have you smoked: 35 yrs


Exposure to second hand smoke: Yes


Alcohol Use: None


Drug Use: none


Patient Lives Alone: No


Significant Family History: diabetes





- Nursing Vital Signs


Nursing Vital Signs: 


 Initial Vital Signs











Temperature  98.4 F   02/23/19 17:53


 


Pulse Rate  104 H  02/23/19 17:53


 


Respiratory Rate  16   02/23/19 17:53


 


Blood Pressure  196/110   02/23/19 17:53


 


O2 Sat by Pulse Oximetry  94 L  02/23/19 17:53








 Pain Scale











Pain Intensity                 5

















- Physical Exam


General Appearance: severe distress, alert, thin


Eye Exam: PERRL/EOMI, photophobia


Ears, Nose, Throat Exam: normal ENT inspection, TMs normal, pharynx normal, 

moist mucous membranes


Neck Exam: normal inspection, non-tender, supple, full range of motion, No 

meningismus, No JVD


Respiratory Exam: normal breath sounds, lungs clear, airway intact, No chest 

tenderness, No respiratory distress, No crackles/rales, No rhonchi, No wheezing


Cardiovascular Exam: regular rate/rhythm, normal heart sounds, normal 

peripheral pulses, tachycardia, capillary refill <2 sec, No murmur


Gastrointestinal/Abdomen Exam: soft, No normal bowel sounds (deminished but 

present), No tenderness, No distention, No guarding, No rebound, No organomegaly


Rectal Exam: deferred


Back Exam: normal inspection, normal range of motion, No CVA tenderness, No 

vertebral tenderness, No rash


Extremity Exam: normal inspection, normal range of motion, No pelvis stable, No 

mike's sign, No pedal edema


Neurologic Exam: alert, oriented x 3, cooperative, CNs II-XII nml as tested, 

normal mood/affect, nml station & gait


Skin Exam: normal color, warm, dry, No rash, No petechiae, No cyanosis


**SpO2 Interpretation**: normal


SpO2: 99


O2 Delivery: Room Air





- Course


Nursing assessment & vital signs reviewed: Yes


Ordered Tests: 


 Active Orders 24 hr











 Category Date Time Status


 


 Code Status Order ROUTINE Care  02/23/19 19:42 Active


 


 IV Insertion STAT Care  02/23/19 18:05 Active


 


 Re-Check Vital Signs STAT Care  02/23/19 18:05 Active


 


 AMYLASE Stat Lab  02/23/19 18:29 Completed


 


 ARTERIAL BLOOD GASES Stat Lab  02/23/19 18:14 Completed


 


 CBC W DIFF Stat Lab  02/23/19 18:29 Completed


 


 CMP Stat Lab  02/23/19 18:29 Completed


 


 LIPASE Stat Lab  02/23/19 18:29 Completed


 


 Lactic Acid Stat Lab  02/23/19 18:14 Completed


 


 MAGNESIUM Stat Lab  02/23/19 18:29 Completed








Medication Summary














Discontinued Medications














Generic Name Dose Route Start Last Admin





  Trade Name Freq  PRN Reason Stop Dose Admin


 


Enoxaparin Sodium  80 mg  02/23/19 19:40  02/23/19 19:51





  Enoxaparin Sodium  1 mg/kg (80 mg)  02/23/19 19:41  Not Given





  SQ   





  STAT ONE   





     





     





     





     


 


Famotidine  20 mg  02/23/19 18:54  02/23/19 18:59





  Pepcid 20 Mg Vial***  IV  02/23/19 18:55  20 mg





  STAT ONE   Administration





     





     





     





     


 


Famotidine  Confirm  02/23/19 18:57  





  Pepcid 20 Mg Vial***  Administered  02/23/19 18:58  





  Dose   





  20 mg   





  IV   





  .STK-MED ONE   





     





     





     





     


 


Hydromorphone HCl  0.5 mg  02/23/19 18:05  02/23/19 18:19





  Hydromorphone 1 Mg/Ml Ampule***  IV  02/23/19 18:06  0.5 mg





  STAT ONE   Administration





     





     





     





     


 


Hydromorphone HCl  Confirm  02/23/19 18:12  





  Hydromorphone 1 Mg/Ml Ampule***  Administered  02/23/19 18:13  





  Dose   





  1 mg   





  .ROUTE   





  .STK-MED ONE   





     





     





     





     


 


Sodium Chloride  1,000 mls @ 999 mls/hr  02/23/19 18:05  02/23/19 19:20





  Sodium Chloride 0.9% 1000 Ml  IV  02/23/19 19:05  Infused





  .Q1H1M STA   Infusion





     





     





     





     


 


Sodium Chloride  Confirm  02/23/19 18:12  





  Sodium Chloride 0.9% 1000 Ml  Administered  02/23/19 18:13  





  Dose   





  1,000 mls @ ud   





  .ROUTE   





  .STK-MED ONE   





     





     





     





     


 


Ondansetron HCl  4 mg  02/23/19 18:05  02/23/19 18:19





  Zofran 4 Mg/2 Ml Vial**  IV  02/23/19 18:06  4 mg





  STAT ONE   Administration





     





     





     





     


 


Ondansetron HCl  Confirm  02/23/19 18:12  





  Zofran 4 Mg/2 Ml Vial**  Administered  02/23/19 18:13  





  Dose   





  4 mg   





  .ROUTE   





  .STK-MED ONE   





     





     





     





     


 


Sucralfate  1 g  02/23/19 18:54  02/23/19 18:59





  Carafate 1 Gm***  PO  02/23/19 18:55  1 g





  STAT ONE   Administration





     





     





     





     


 


Sucralfate  Confirm  02/23/19 18:57  





  Carafate 1 Gm***  Administered  02/23/19 18:58  





  Dose   





  1 g   





  PO   





  .STK-MED ONE   





     





     





     





     











Lab/Rad Data: 


 Laboratory Result Diagrams





 02/23/19 18:29 





 02/23/19 18:29 





 Laboratory Results











  02/23/19 02/23/19 02/23/19 Range/Units





  18:29 18:29 18:29 


 


WBC    6.3  (4.0-10.5)  K/mm3


 


RBC    4.52  (4.1-5.6)  M/mm3


 


Hgb    14.4  (12.5-18.0)  gm/dl


 


Hct    43.2  (42-50)  %


 


MCV    95.6  ()  fl


 


MCH    31.9  (26-32)  pg


 


MCHC    33.3  (32-36)  g/dl


 


RDW    12.3  (11.5-14.0)  %


 


Plt Count    213  (150-450)  K/mm3


 


MPV    11.3 H  (6-9.5)  fl


 


Gran %    50.0  (36.0-66.0)  %


 


Eos # (Auto)    0.16  (0-0.5)  


 


Absolute Lymphs (auto)    2.56  (1.0-4.6)  


 


Absolute Monos (auto)    0.38  (0.0-1.3)  


 


Lymphocytes %    40.8  (24.0-44.0)  %


 


Monocytes %    6.1  (0.0-12.0)  %


 


Eosinophils %    2.5  (0.00-5.0)  %


 


Basophils %    0.6  (0.0-0.4)  %


 


Absolute Granulocytes    3.14  (1.4-6.9)  


 


Basophils #    0.04  (0-0.4)  


 


Puncture Site     


 


pCO2     (35-45)  mmHg


 


pO2     ()  mmHg


 


Base Excess     (-2.0-2.0)  


 


O2 Saturation     ()  g/dF


 


ABG pH     (7.35-7.45)  


 


ABG HCO3     (22-28)  


 


ABG O2 Sat (Measured)     ()  %


 


Shamar Test     


 


A-a Gradient     


 


a/A Ratio     


 


Hemoglobin     


 


Carboxyhemoglobin     (0.0-6.9)  % THgb


 


Methemoglobin     (1.4-1.5)  %


 


Potassium   3.9   (3.5-5.1)  


 


Temperature     C


 


POC O2 Flow Rate     %


 


Sodium   140   (137-145)  mmol/L


 


Chloride   102   ()  mmol/L


 


Carbon Dioxide   29   (22-30)  mmol/L


 


Anion Gap   13.1   (5-15)  MEQ/L


 


BUN   20   (9-20)  mg/dL


 


Creatinine   1.00   (0.66-1.25)  mg/dL


 


Estimated GFR   > 60.0   ML/MIN


 


Glucose   71 L   ()  mg/dL


 


Lactic Acid     (0.4-2.0)  


 


Calcium   9.6   (8.4-10.2)  mg/dL


 


Magnesium  2.0    (1.6-2.3)  mg/dL


 


Total Bilirubin   0.40   (0.2-1.3)  mg/dL


 


AST   23   (17-59)  U/L


 


ALT   19   (0-50)  U/L


 


Alkaline Phosphatase   104   ()  U/L


 


Serum Total Protein   8.3 H   (6.3-8.2)  g/dL


 


Albumin   4.7   (3.5-5.0)  g/dL


 


Amylase   76   ()  U/L


 


Lipase   121   ()  U/L














  02/23/19 02/23/19 Range/Units





  18:14 18:14 


 


WBC    (4.0-10.5)  K/mm3


 


RBC    (4.1-5.6)  M/mm3


 


Hgb    (12.5-18.0)  gm/dl


 


Hct    (42-50)  %


 


MCV    ()  fl


 


MCH    (26-32)  pg


 


MCHC    (32-36)  g/dl


 


RDW    (11.5-14.0)  %


 


Plt Count    (150-450)  K/mm3


 


MPV    (6-9.5)  fl


 


Gran %    (36.0-66.0)  %


 


Eos # (Auto)    (0-0.5)  


 


Absolute Lymphs (auto)    (1.0-4.6)  


 


Absolute Monos (auto)    (0.0-1.3)  


 


Lymphocytes %    (24.0-44.0)  %


 


Monocytes %    (0.0-12.0)  %


 


Eosinophils %    (0.00-5.0)  %


 


Basophils %    (0.0-0.4)  %


 


Absolute Granulocytes    (1.4-6.9)  


 


Basophils #    (0-0.4)  


 


Puncture Site  RIGHT BRACHIAL   


 


pCO2  35   (35-45)  mmHg


 


pO2  83   ()  mmHg


 


Base Excess  2.1 H   (-2.0-2.0)  


 


O2 Saturation  90.7 L   ()  g/dF


 


ABG pH  7.47 H   (7.35-7.45)  


 


ABG HCO3  25.5   (22-28)  


 


ABG O2 Sat (Measured)  98.3   ()  %


 


Shamar Test  NOT APPLICABLE   


 


A-a Gradient  23   


 


a/A Ratio  0.78   


 


Hemoglobin  13.7   


 


Carboxyhemoglobin  6.4   (0.0-6.9)  % THgb


 


Methemoglobin  1.3 L   (1.4-1.5)  %


 


Potassium  3.7   (3.5-5.1)  


 


Temperature  37.0   C


 


POC O2 Flow Rate  21   %


 


Sodium    (137-145)  mmol/L


 


Chloride    ()  mmol/L


 


Carbon Dioxide    (22-30)  mmol/L


 


Anion Gap    (5-15)  MEQ/L


 


BUN    (9-20)  mg/dL


 


Creatinine    (0.66-1.25)  mg/dL


 


Estimated GFR    ML/MIN


 


Glucose    ()  mg/dL


 


Lactic Acid   0.8  (0.4-2.0)  


 


Calcium    (8.4-10.2)  mg/dL


 


Magnesium    (1.6-2.3)  mg/dL


 


Total Bilirubin    (0.2-1.3)  mg/dL


 


AST    (17-59)  U/L


 


ALT    (0-50)  U/L


 


Alkaline Phosphatase    ()  U/L


 


Serum Total Protein    (6.3-8.2)  g/dL


 


Albumin    (3.5-5.0)  g/dL


 


Amylase    ()  U/L


 


Lipase    ()  U/L














- Progress


Progress: improved (after meds and Iv fluid), re-examined (after meds)


Progress Note: 





02/23/19 19:11


IV started, meds given ABG checked; labs pending; accu check 79; ABG ph= 7.47; 

pCO2 35; Po2 = 83 HCO3- 25.5Carboxy Hb = 6.4 = heavy smoker by hx


02/23/19 19:13


ABG showed no KEto acidosis; Mg 2.0CMP BS 71; renal fx ok; lytes ok adin lip ok; 

CBC wnl;lactate 0.8 wnl


after meds and IV fluids HA down to 4/10 from 10/ 10; N&V  resolved; abd pain 

now mild epig only; will give carafate and recheck; recheck BS 84; will monitor 

and recheck


02/23/19 19:15


VS improved








02/23/19 20:12


recheck wife at bedside- BS dropped to 64 - gave OJ; rechecked and improved to90

; feeling much better; NO HA; No N&V; no abd pain; reviewed results - 

instructions given


Counseled pt/family regarding: lab results, diagnosis, need for follow-up, 

smoking cessation





- Departure


Time of Disposition: 20:13


Departure Disposition: Home


Clinical Impression: 


 Diabetes type 1, uncontrolled, Headache, Abdominal pain of unknown cause, 

Vomiting





Condition: Stable


Critical Care Time: No


Referrals: 


XIN VERDUGO MD [Primary Care Provider] - 


Instructions:  Acute Abdomen (Belly Pain), Adult (DC), Type 1 Diabetes


Additional Instructions: 


WATCH BLOOD SUGAR; FOLLOW UP DIABETOLOGIST


Follow-up with family doctor as directed. Call for appointment.  Return if any 

problems. If you smoke please stop. Call or follow up with your family doctor 

for assistance if you need it to stop.  Please wear your seatbelt when driving. 

Have a nice day.


Thank you for allowing us to participate in your care today.





:o)





Dr Jaxon Bergeron

## 2019-08-18 ENCOUNTER — HOSPITAL ENCOUNTER (EMERGENCY)
Dept: HOSPITAL 33 - ED | Age: 54
Discharge: HOME | End: 2019-08-18
Payer: COMMERCIAL

## 2019-08-18 VITALS — SYSTOLIC BLOOD PRESSURE: 91 MMHG | DIASTOLIC BLOOD PRESSURE: 55 MMHG | HEART RATE: 77 BPM

## 2019-08-18 VITALS — OXYGEN SATURATION: 97 %

## 2019-08-18 DIAGNOSIS — K52.9: Primary | ICD-10-CM

## 2019-08-18 LAB
ALBUMIN SERPL-MCNC: 4.5 G/DL (ref 3.5–5)
ALP SERPL-CCNC: 162 U/L (ref 38–126)
ALT SERPL-CCNC: 16 U/L (ref 0–50)
ANION GAP SERPL CALC-SCNC: 17.6 MEQ/L (ref 5–15)
AST SERPL QL: 25 U/L (ref 17–59)
BASOPHILS # BLD AUTO: 0.03 10*3/UL (ref 0–0.4)
BASOPHILS NFR BLD AUTO: 0.3 % (ref 0–0.4)
BILIRUB BLD-MCNC: 1.1 MG/DL (ref 0.2–1.3)
BUN SERPL-MCNC: 17 MG/DL (ref 9–20)
CALCIUM SPEC-MCNC: 9.8 MG/DL (ref 8.4–10.2)
CHLORIDE SERPL-SCNC: 101 MMOL/L (ref 98–107)
CO2 SERPL-SCNC: 23 MMOL/L (ref 22–30)
CREAT SERPL-MCNC: 0.97 MG/DL (ref 0.66–1.25)
EOSINOPHIL # BLD AUTO: 0.16 10*3/UL (ref 0–0.5)
GLUCOSE SERPL-MCNC: 397 MG/DL (ref 74–106)
GLUCOSE UR-MCNC: >=500 MG/DL
GRANULOCYTES # BLD AUTO: 8.09 10*3/UL (ref 1.4–6.9)
HCT VFR BLD AUTO: 46.7 % (ref 42–50)
HGB BLD-MCNC: 15.9 GM/DL (ref 12.5–18)
LYMPHOCYTES # SPEC AUTO: 1.34 10*3/UL (ref 1–4.6)
MCH RBC QN AUTO: 32 PG (ref 26–32)
MCHC RBC AUTO-ENTMCNC: 34 G/DL (ref 32–36)
MONOCYTES # BLD AUTO: 0.38 10*3/UL (ref 0–1.3)
NEUTROPHILS NFR BLD AUTO: 80.9 % (ref 36–66)
PLATELET # BLD AUTO: 198 K/MM3 (ref 150–450)
POTASSIUM SERPLBLD-SCNC: 4.6 MMOL/L (ref 3.5–5.1)
PROT SERPL-MCNC: 8.1 G/DL (ref 6.3–8.2)
PROT UR STRIP-MCNC: NEGATIVE MG/DL
RBC # BLD AUTO: 4.97 M/MM3 (ref 4.1–5.6)
RBC #/AREA URNS HPF: (no result) /HPF (ref 0–2)
SODIUM SERPL-SCNC: 137 MMOL/L (ref 137–145)
WBC # BLD AUTO: 10 K/MM3 (ref 4–10.5)
WBC #/AREA URNS HPF: (no result) /HPF (ref 0–5)

## 2019-08-18 PROCEDURE — 83605 ASSAY OF LACTIC ACID: CPT

## 2019-08-18 PROCEDURE — 80053 COMPREHEN METABOLIC PANEL: CPT

## 2019-08-18 PROCEDURE — 36000 PLACE NEEDLE IN VEIN: CPT

## 2019-08-18 PROCEDURE — 96360 HYDRATION IV INFUSION INIT: CPT

## 2019-08-18 PROCEDURE — 74177 CT ABD & PELVIS W/CONTRAST: CPT

## 2019-08-18 PROCEDURE — 81001 URINALYSIS AUTO W/SCOPE: CPT

## 2019-08-18 PROCEDURE — 99284 EMERGENCY DEPT VISIT MOD MDM: CPT

## 2019-08-18 PROCEDURE — 96361 HYDRATE IV INFUSION ADD-ON: CPT

## 2019-08-18 PROCEDURE — 96374 THER/PROPH/DIAG INJ IV PUSH: CPT

## 2019-08-18 PROCEDURE — 85025 COMPLETE CBC W/AUTO DIFF WBC: CPT

## 2019-08-18 PROCEDURE — 36415 COLL VENOUS BLD VENIPUNCTURE: CPT

## 2019-08-18 NOTE — ERPHSYRPT
- History of Present Illness


Time Seen by Provider: 08/18/19 09:45


Source: patient, family (Spouse)


Exam Limitations: clinical condition (Somulent; easily arrousable)


Patient Subjective Stated Complaint: states has felt bad since last night with 

vomiting and high blood sugar.


Triage Nursing Assessment: to room per w/c.  skin w/d, color normal, resp 

nonlabored.  patient appears drowsy but awakens easily.  a/o times three.  

insulin pump on at this time.


Physician History: 


Spouse states that they have been camping several weeks - yesterday nothing 

unusual.  Last night nausea and vomiting; blood sugar high and having trouble 

with insulin pump.  Spouse concerned re ketones.





Severity: moderate


Associated Symptoms: nausea, vomiting, abdominal pain, malaise


Allergies/Adverse Reactions: 








No Known Drug Allergies Allergy (Verified 08/18/19 09:37)


 





Home Medications: 








Sertraline HCl 50 mg** [Zoloft 50 mg Tablet**] 150 mg PO HS 09/26/18 [History]


Insulin Lispro [Admelog] 9 units IJ TID 02/23/19 [History]


Donepezil HCl 10 mg PO HS 08/18/19 [History]





Hx Tetanus, Diphtheria Vaccination/Date Given: No


Hx Influenza Vaccination/Date Given: No


Hx Pneumococcal Vaccination/Date Given: No





- Review of Systems


Constitutional: Malaise


Eyes: No Symptoms


Ears, Nose, & Throat: No Symptoms


Respiratory: No Symptoms


Cardiac: No Symptoms


Abdominal/Gastrointestinal: Nausea, Vomiting, No Diarrhea


All Other Systems: Reviewed and Negative





- Past Medical History


Pertinent Past Medical History: Yes


Neurological History: No Pertinent History


ENT History: No Pertinent History


Cardiac History: No Pertinent History


Respiratory History: No Pertinent History


Endocrine Medical History: Diabetes Type II


Musculoskeletal History: No Pertinent History


GI Medical History: No Pertinent History


 History: No Pertinent History


Psycho-Social History: Anxiety


Male Reproductive Disorders: No Pertinent History





- Past Surgical History


Past Surgical History: No





- Social History


Smoking Status: Current every day smoker


How long have you smoked: 30


Exposure to second hand smoke: Yes


Alcohol Use: None


Drug Use: none


Patient Lives Alone: No


Significant Family History: diabetes





- Nursing Vital Signs


Nursing Vital Signs: 


 Initial Vital Signs











Temperature  97.8 F   08/18/19 09:31


 


Pulse Rate  75   08/18/19 09:31


 


Respiratory Rate  16   08/18/19 09:31


 


Blood Pressure  117/72   08/18/19 09:31


 


O2 Sat by Pulse Oximetry  97   08/18/19 09:31








 Pain Scale











Pain Intensity                 0

















- Physical Exam


General Appearance: lethargy (somulent; easily arrousable)


Eye Exam: PERRL/EOMI


Ears, Nose, Throat Exam: normal ENT inspection, pharynx normal


Neck Exam: normal inspection


Respiratory Exam: normal breath sounds, lungs clear, airway intact


Cardiovascular Exam: regular rate/rhythm, normal heart sounds, normal 

peripheral pulses, No edema


Gastrointestinal/Abdomen Exam: soft, normal bowel sounds, tenderness


Extremity Exam: normal inspection, normal range of motion


**SpO2 Interpretation**: normal


SpO2: 97


O2 Delivery: Room Air





- Course


Nursing assessment & vital signs reviewed: Yes


Ordered Tests: 


 Active Orders 24 hr











 Category Date Time Status


 


 ABDOMEN AND PELVIS W CONTRAST [CT] Stat Exams  08/18/19 11:32 Taken


 


 CBC W DIFF Stat Lab  08/18/19 09:50 Completed


 


 CMP Stat Lab  08/18/19 09:50 Completed


 


 Lactic Acid Stat Lab  08/18/19 09:50 Completed


 


 UA W/RFX UR CULTURE Stat Lab  08/18/19 10:57 Completed








Medication Summary














Discontinued Medications














Generic Name Dose Route Start Last Admin





  Trade Name Freq  PRN Reason Stop Dose Admin


 


Sodium Chloride  1,000 mls @ 999 mls/hr  08/18/19 09:50  08/18/19 11:03





  Sodium Chloride 0.9% 1000 Ml  IV  08/18/19 10:50  Infused





  .Q1H1M STA   Infusion





     





     





     





     


 


Sodium Chloride  Confirm  08/18/19 09:56  





  Sodium Chloride 0.9% 1000 Ml  Administered  08/18/19 09:57  





  Dose   





  1,000 mls @ ud   





  .ROUTE   





  .STK-MED ONE   





     





     





     





     


 


Sodium Chloride  Confirm  08/18/19 11:04  





  Sodium Chloride 0.9% 1000 Ml  Administered  08/18/19 11:05  





  Dose   





  1,000 mls @ ud   





  .ROUTE   





  .STK-MED ONE   





     





     





     





     


 


Sodium Chloride  1,000 mls @ 999 mls/hr  08/18/19 11:10  08/18/19 12:50





  Sodium Chloride 0.9% 1000 Ml  IV  08/18/19 12:10  Infused





  .Q1H1M STA   Infusion





     





     





     





     


 


Ondansetron HCl  4 mg  08/18/19 09:55  08/18/19 09:59





  Zofran 4 Mg/2 Ml Vial**  IV  08/18/19 09:56  4 mg





  STAT ONE   Administration





     





     





     





     


 


Ondansetron HCl  Confirm  08/18/19 09:58  





  Zofran 4 Mg/2 Ml Vial**  Administered  08/18/19 09:59  





  Dose   





  4 mg   





  .ROUTE   





  .STK-MED ONE   





     





     





     





     











Lab/Rad Data: 


 Laboratory Result Diagrams





 08/18/19 09:50 





 08/18/19 09:50 





 Laboratory Results











  08/18/19 08/18/19 08/18/19 Range/Units





  10:57 09:50 09:50 


 


WBC     (4.0-10.5)  K/mm3


 


RBC     (4.1-5.6)  M/mm3


 


Hgb     (12.5-18.0)  gm/dl


 


Hct     (42-50)  %


 


MCV     ()  fl


 


MCH     (26-32)  pg


 


MCHC     (32-36)  g/dl


 


RDW     (11.5-14.0)  %


 


Plt Count     (150-450)  K/mm3


 


MPV     (6-9.5)  fl


 


Gran %     (36.0-66.0)  %


 


Eos # (Auto)     (0-0.5)  


 


Absolute Lymphs (auto)     (1.0-4.6)  


 


Absolute Monos (auto)     (0.0-1.3)  


 


Lymphocytes %     (24.0-44.0)  %


 


Monocytes %     (0.0-12.0)  %


 


Eosinophils %     (0.00-5.0)  %


 


Basophils %     (0.0-0.4)  %


 


Absolute Granulocytes     (1.4-6.9)  


 


Basophils #     (0-0.4)  


 


Sodium   137   (137-145)  mmol/L


 


Potassium   4.6   (3.5-5.1)  mmol/L


 


Chloride   101   ()  mmol/L


 


Carbon Dioxide   23   (22-30)  mmol/L


 


Anion Gap   17.6 H   (5-15)  MEQ/L


 


BUN   17   (9-20)  mg/dL


 


Creatinine   0.97   (0.66-1.25)  mg/dL


 


Estimated GFR   > 60.0   ML/MIN


 


Glucose   397 H   ()  mg/dL


 


Lactic Acid    1.7  (0.4-2.0)  


 


Calcium   9.8   (8.4-10.2)  mg/dL


 


Total Bilirubin   1.10   (0.2-1.3)  mg/dL


 


AST   25   (17-59)  U/L


 


ALT   16   (0-50)  U/L


 


Alkaline Phosphatase   162 H   ()  U/L


 


Serum Total Protein   8.1   (6.3-8.2)  g/dL


 


Albumin   4.5   (3.5-5.0)  g/dL


 


Urine Color  STRAW    (YELLOW)  


 


Urine Appearance  CLEAR    (CLEAR)  


 


Urine pH  5.0    (5-6)  


 


Ur Specific Gravity  1.021    (1.005-1.025)  


 


Urine Protein  NEGATIVE    (Negative)  


 


Urine Ketones  SMALL    (NEGATIVE)  


 


Urine Blood  NEGATIVE    (0-5)  Ernie/ul


 


Urine Nitrite  NEGATIVE    (NEGATIVE)  


 


Urine Bilirubin  NEGATIVE    (NEGATIVE)  


 


Urine Urobilinogen  NEGATIVE    (0-1)  mg/dL


 


Ur Leukocyte Esterase  NEGATIVE    (NEGATIVE)  


 


Urine WBC (Auto)  NONE    (0-5)  /HPF


 


Urine RBC (Auto)  NONE    (0-2)  /HPF


 


U Epithel Cells (Auto)  NONE    (FEW)  /HPF


 


Urine Bacteria (Auto)  NONE    (NEGATIVE)  /HPF


 


Urine Culture Reflexed  NO    (NO)  


 


Urine Glucose  >=500    (NEGATIVE)  mg/dL














  08/18/19 Range/Units





  09:50 


 


WBC  10.0  (4.0-10.5)  K/mm3


 


RBC  4.97  (4.1-5.6)  M/mm3


 


Hgb  15.9  (12.5-18.0)  gm/dl


 


Hct  46.7  (42-50)  %


 


MCV  94.0  ()  fl


 


MCH  32.0  (26-32)  pg


 


MCHC  34.0  (32-36)  g/dl


 


RDW  12.5  (11.5-14.0)  %


 


Plt Count  198  (150-450)  K/mm3


 


MPV  10.9 H  (6-9.5)  fl


 


Gran %  80.9 H  (36.0-66.0)  %


 


Eos # (Auto)  0.16  (0-0.5)  


 


Absolute Lymphs (auto)  1.34  (1.0-4.6)  


 


Absolute Monos (auto)  0.38  (0.0-1.3)  


 


Lymphocytes %  13.4 L  (24.0-44.0)  %


 


Monocytes %  3.8  (0.0-12.0)  %


 


Eosinophils %  1.6  (0.00-5.0)  %


 


Basophils %  0.3  (0.0-0.4)  %


 


Absolute Granulocytes  8.09 H  (1.4-6.9)  


 


Basophils #  0.03  (0-0.4)  


 


Sodium   (137-145)  mmol/L


 


Potassium   (3.5-5.1)  mmol/L


 


Chloride   ()  mmol/L


 


Carbon Dioxide   (22-30)  mmol/L


 


Anion Gap   (5-15)  MEQ/L


 


BUN   (9-20)  mg/dL


 


Creatinine   (0.66-1.25)  mg/dL


 


Estimated GFR   ML/MIN


 


Glucose   ()  mg/dL


 


Lactic Acid   (0.4-2.0)  


 


Calcium   (8.4-10.2)  mg/dL


 


Total Bilirubin   (0.2-1.3)  mg/dL


 


AST   (17-59)  U/L


 


ALT   (0-50)  U/L


 


Alkaline Phosphatase   ()  U/L


 


Serum Total Protein   (6.3-8.2)  g/dL


 


Albumin   (3.5-5.0)  g/dL


 


Urine Color   (YELLOW)  


 


Urine Appearance   (CLEAR)  


 


Urine pH   (5-6)  


 


Ur Specific Gravity   (1.005-1.025)  


 


Urine Protein   (Negative)  


 


Urine Ketones   (NEGATIVE)  


 


Urine Blood   (0-5)  Ernie/ul


 


Urine Nitrite   (NEGATIVE)  


 


Urine Bilirubin   (NEGATIVE)  


 


Urine Urobilinogen   (0-1)  mg/dL


 


Ur Leukocyte Esterase   (NEGATIVE)  


 


Urine WBC (Auto)   (0-5)  /HPF


 


Urine RBC (Auto)   (0-2)  /HPF


 


U Epithel Cells (Auto)   (FEW)  /HPF


 


Urine Bacteria (Auto)   (NEGATIVE)  /HPF


 


Urine Culture Reflexed   (NO)  


 


Urine Glucose   (NEGATIVE)  mg/dL














- Progress


Progress: improved


Progress Note: 





08/18/19 13:05


Advised the patient denisa the CT showed some changes in the gall bladder wall 

suggestive of non radio opaque stones or sludge.  Suggested primary care for 

ordering an outpatient Ultra Sound.  Also noted was some question of incomplete 

filling of the colon or a possible colitis.  This was of course consistent with 

his presentation of nausea, vomiting and diarrhea onset last night.





- Departure


Departure Disposition: Home


Clinical Impression: 


 Colitis





Clinical Impression: 


 (Ruled Out): Bladder wall thickening


Condition: Stable


Critical Care Time: Yes


Critical Care Time(excluding separately billable procedures): Critical 30-74 

mins


Referrals: 


XIN VERDUGO MD [Primary Care Provider] - 


Additional Instructions: 


Continue to monitor blood sugar; use Zofran for nausea.  Keep well hydrated 

with clear liquids - small amounts over the next 12 hours.  Call primary care 

tomorrow and let them know how you are doing.  Advise that a Gall bladder 

ultrasound was suggested for possible stones not showing on CT or sludge.


Prescriptions: 


Ondansetron ODT 4 MG*** [Zofran Odt 4 mg***] 4 mg PO STAT #10 tab.rapdis

## 2019-08-18 NOTE — XRAY
Indication: Vomiting.  Dry hives.  Diabetes.  High blood pressure.



Multiple contiguous axial images obtained through the abdomen and pelvis using

80 cc of Isovue-370 contrast only.



Comparison: None



Lung bases demonstrates mild bibasilar dependent atelectasis.  No infiltrate

or effusion.  Heart is not enlarged.



Noncontrasted stomach and bowel loops appear nonobstructed.  Normal appendix.

No free fluid/air.  3 left renal exophytic cysts, largest 1.6 cm.



Remaining liver, gallbladder, pancreas, spleen, adrenal glands, kidneys,

ureters, bladder, and aorta appear unremarkable.  No pathologic

retroperitoneal lymphadenopathy.



Osseous structures intact with mild lumbosacral junction degenerative changes.



Impression:

1.  Left renal cysts.

2.  Remaining CT abdomen/pelvis with contrast exam is negative.



Comment: Preliminary interpretation was made by [facility].  No critical discrepancy.



CTDI 17.35

## 2019-08-19 ENCOUNTER — HOSPITAL ENCOUNTER (EMERGENCY)
Dept: HOSPITAL 33 - ED | Age: 54
Discharge: HOME | End: 2019-08-19
Payer: COMMERCIAL

## 2019-08-19 VITALS — HEART RATE: 78 BPM

## 2019-08-19 VITALS — SYSTOLIC BLOOD PRESSURE: 101 MMHG | DIASTOLIC BLOOD PRESSURE: 73 MMHG | OXYGEN SATURATION: 96 %

## 2019-08-19 DIAGNOSIS — E10.65: Primary | ICD-10-CM

## 2019-08-19 LAB
ALBUMIN SERPL-MCNC: 3.4 G/DL (ref 3.5–5)
ALP SERPL-CCNC: 90 U/L (ref 38–126)
ALT SERPL-CCNC: 13 U/L (ref 0–50)
AMPHETAMINES UR QL: NEGATIVE
ANION GAP SERPL CALC-SCNC: 10.2 MEQ/L (ref 5–15)
AST SERPL QL: 22 U/L (ref 17–59)
BARBITURATES UR QL: NEGATIVE
BASOPHILS # BLD AUTO: 0.03 10*3/UL (ref 0–0.4)
BASOPHILS NFR BLD AUTO: 0.4 % (ref 0–0.4)
BENZODIAZ UR QL SCN: NEGATIVE
BILIRUB BLD-MCNC: 0.5 MG/DL (ref 0.2–1.3)
BUN BLD-MCNC: 20 MG/DL (ref 8–26)
CALCIUM SPEC-MCNC: 8.6 MG/DL (ref 8.4–10.2)
CK SERPL-CCNC: 78 U/L (ref 55–170)
CO2 BLDV-SCNC: 26 MMOL/L (ref 24–29)
COCAINE UR QL SCN: NEGATIVE
CREAT BLD-MCNC: 0.9 MG/DL (ref 0.6–1.3)
EOSINOPHIL # BLD AUTO: 0.12 10*3/UL (ref 0–0.5)
GLUCOSE BLD-MCNC: 290 MG/DL (ref 70–105)
GLUCOSE UR-MCNC: >=500 MG/DL
GRANULOCYTES # BLD AUTO: 5.87 10*3/UL (ref 1.4–6.9)
HCT VFR BLD AUTO: 38.7 % (ref 42–50)
HGB BLD-MCNC: 13.2 GM/DL (ref 12.5–18)
LIPASE SERPL-CCNC: 22 U/L (ref 23–300)
LYMPHOCYTES # SPEC AUTO: 2.14 10*3/UL (ref 1–4.6)
MAGNESIUM SERPL-MCNC: 2.1 MG/DL (ref 1.6–2.3)
MCH RBC QN AUTO: 32.2 PG (ref 26–32)
MCHC RBC AUTO-ENTMCNC: 34.1 G/DL (ref 32–36)
METHADONE UR QL: NEGATIVE
MONOCYTES # BLD AUTO: 0.34 10*3/UL (ref 0–1.3)
NEUTROPHILS NFR BLD AUTO: 69 % (ref 36–66)
OPIATES UR QL: POSITIVE
PCP UR QL CFM>20 NG/ML: NEGATIVE
PLATELET # BLD AUTO: 183 K/MM3 (ref 150–450)
PROT SERPL-MCNC: 6.4 G/DL (ref 6.3–8.2)
PROT UR STRIP-MCNC: NEGATIVE MG/DL
RBC # BLD AUTO: 4.09 M/MM3 (ref 4.1–5.6)
RBC #/AREA URNS HPF: (no result) /HPF (ref 0–2)
THC UR QL SCN: NEGATIVE
WBC # BLD AUTO: 8.5 K/MM3 (ref 4–10.5)
WBC #/AREA URNS HPF: (no result) /HPF (ref 0–5)

## 2019-08-19 PROCEDURE — 80047 BASIC METABLC PNL IONIZED CA: CPT

## 2019-08-19 PROCEDURE — 80307 DRUG TEST PRSMV CHEM ANLYZR: CPT

## 2019-08-19 PROCEDURE — 82550 ASSAY OF CK (CPK): CPT

## 2019-08-19 PROCEDURE — 99284 EMERGENCY DEPT VISIT MOD MDM: CPT

## 2019-08-19 PROCEDURE — 81001 URINALYSIS AUTO W/SCOPE: CPT

## 2019-08-19 PROCEDURE — 93005 ELECTROCARDIOGRAM TRACING: CPT

## 2019-08-19 PROCEDURE — 83735 ASSAY OF MAGNESIUM: CPT

## 2019-08-19 PROCEDURE — 83605 ASSAY OF LACTIC ACID: CPT

## 2019-08-19 PROCEDURE — 87651 STREP A DNA AMP PROBE: CPT

## 2019-08-19 PROCEDURE — 96360 HYDRATION IV INFUSION INIT: CPT

## 2019-08-19 PROCEDURE — 83690 ASSAY OF LIPASE: CPT

## 2019-08-19 PROCEDURE — 84484 ASSAY OF TROPONIN QUANT: CPT

## 2019-08-19 PROCEDURE — 70450 CT HEAD/BRAIN W/O DYE: CPT

## 2019-08-19 PROCEDURE — 85025 COMPLETE CBC W/AUTO DIFF WBC: CPT

## 2019-08-19 PROCEDURE — 36415 COLL VENOUS BLD VENIPUNCTURE: CPT

## 2019-08-19 PROCEDURE — 72125 CT NECK SPINE W/O DYE: CPT

## 2019-08-19 PROCEDURE — 36000 PLACE NEEDLE IN VEIN: CPT

## 2019-08-19 PROCEDURE — 80053 COMPREHEN METABOLIC PANEL: CPT

## 2019-08-19 NOTE — XRAY
Indication: Right arm weakness.  I blood pressure.



Multiple contiguous axial images obtained through the cervical spine.

Sagittal and coronal reformatted images obtained.



Comparison: None



Axial images negative for acute fracture, suspicious bony lesions, or spinal

canal stenosis.  Mild C4-C5 degenerative endplate spurring.  Sagittal and

coronal reformatted images demonstrate normal alignment with mild C4-C5 disc

space narrowing.  No acute compression fracture, subluxation, or jumped facet.

 Normal appearing craniocervical junction.



Visualized noncontrasted soft tissues unremarkable.  Lung apices demonstrates

pulmonary emphysema.



Impression:

1.  Mild C4-C5 degenerative changes and pulmonary emphysema.

2.  Remaining CT cervical spine is negative.



CTDI 50.27

## 2019-08-19 NOTE — XRAY
Indication: Right arm numbness and weakness.  High blood pressure.



Multiple contiguous axial images obtained through the head without contrast.



Comparison: September 2, 2016.



Again normal appearing brain parenchyma, ventricles, and bony calvarium.

Minimal mucosal thickening both maxillary sinuses without fluid leveling.

Remaining visualized paranasal sinuses and mastoid air cells are clear.



Impression: Again minimal paranasal sinus disease.  No new or acute

intracranial abnormalities.



CT DI 69.52

## 2019-10-06 ENCOUNTER — HOSPITAL ENCOUNTER (EMERGENCY)
Dept: HOSPITAL 33 - ED | Age: 54
Discharge: HOME | End: 2019-10-06
Payer: COMMERCIAL

## 2019-10-06 VITALS — DIASTOLIC BLOOD PRESSURE: 78 MMHG | SYSTOLIC BLOOD PRESSURE: 120 MMHG | HEART RATE: 70 BPM

## 2019-10-06 DIAGNOSIS — R07.81: Primary | ICD-10-CM

## 2019-10-06 PROCEDURE — 71100 X-RAY EXAM RIBS UNI 2 VIEWS: CPT

## 2019-10-06 PROCEDURE — 71046 X-RAY EXAM CHEST 2 VIEWS: CPT

## 2019-10-06 PROCEDURE — 99284 EMERGENCY DEPT VISIT MOD MDM: CPT

## 2019-10-06 PROCEDURE — 96372 THER/PROPH/DIAG INJ SC/IM: CPT

## 2019-10-06 NOTE — ERPHSYRPT
- History of Present Illness


Time Seen by Provider: 10/06/19 09:03


Source: patient


Exam Limitations: no limitations


Patient Subjective Stated Complaint: pt reports he choked one week ago and 

received the heimlich. states he has left sided rib pain that is increased with 

inspiration and movement.


Triage Nursing Assessment: pt is aox3, pupils perrl, afebrile, resps easy and 

non labored, pt lung sounds are clear throughout all fields, radial pulses 

strong and equal, cap refill < 3 seconds, pt skin pink warm dry. no obvious 

injury or deformity noted. skin is intact. insulin pump noted to pt lower left 

abdomen.


Physician History: 





pt reports he choked one week ago and received the heimlich. states he has left 

sided rib pain that is increased with inspiration and movement.


Timing/Duration: week(s) (for 1 week)


Severity: moderate


Modifying Factors: Improves With: nothing


Associated Symptoms: denies symptoms


Allergies/Adverse Reactions: 








No Known Drug Allergies Allergy (Verified 10/06/19 09:02)


 





Home Medications: 








Sertraline HCl 50 mg** [Zoloft 50 mg Tablet**] 150 mg PO HS 09/26/18 [History]


Insulin Lispro [Admelog] 9 units IJ TID 02/23/19 [History]


Donepezil HCl 10 mg PO HS 08/18/19 [History]





Hx Tetanus, Diphtheria Vaccination/Date Given:  (unk)


Hx Influenza Vaccination/Date Given: No


Hx Pneumococcal Vaccination/Date Given: No


Immunizations Up to Date: Yes





- Review of Systems


Constitutional: No Symptoms


Eyes: No Symptoms


Ears, Nose, & Throat: No Symptoms


Respiratory: Other (left side chest wall pain)


Cardiac: No Symptoms


Abdominal/Gastrointestinal: No Symptoms


Genitourinary Symptoms: No Symptoms


Musculoskeletal: Injury (left side chest wall injury)





- Past Medical History


Pertinent Past Medical History: Yes


Neurological History: No Pertinent History


ENT History: No Pertinent History


Cardiac History: No Pertinent History


Respiratory History: No Pertinent History


Endocrine Medical History: Diabetes Type I


Musculoskeletal History: No Pertinent History


GI Medical History: No Pertinent History


 History: No Pertinent History


Psycho-Social History: Anxiety


Male Reproductive Disorders: No Pertinent History





- Past Surgical History


Past Surgical History: No





- Social History


Smoking Status: Current every day smoker


How long have you smoked: 30


Exposure to second hand smoke: Yes


Alcohol Use: None


Drug Use: none


Patient Lives Alone: No


Significant Family History: diabetes





- Nursing Vital Signs


Nursing Vital Signs: 


 Initial Vital Signs











Temperature  97.7 F   10/06/19 08:52


 


Pulse Rate  67   10/06/19 08:52


 


Respiratory Rate  20   10/06/19 08:52


 


Blood Pressure  119/79   10/06/19 08:52


 


O2 Sat by Pulse Oximetry  100   10/06/19 08:52








 Pain Scale











Pain Intensity                 6

















- Physical Exam


General Appearance: no apparent distress


Eye Exam: PERRL/EOMI


Ears, Nose, Throat Exam: normal ENT inspection


Neck Exam: normal inspection


Respiratory Exam: chest tenderness (left side)


Cardiovascular Exam: regular rate/rhythm


Gastrointestinal/Abdomen Exam: soft


Male Genitalia Exam: normal genitalia


Back Exam: normal inspection


Extremity Exam: normal inspection


SpO2: 100





- Radiology Exams


  ** Chest


X-ray Interpretation: Reviewed by me





  ** Ribs


X-ray Interpretation: Reviewed by me


Ordered Tests: 


Medication Summary














Discontinued Medications














Generic Name Dose Route Start Last Admin





  Trade Name Freq  PRN Reason Stop Dose Admin


 


Ketorolac Tromethamine  60 mg  10/06/19 09:02  10/06/19 09:15





  Toradol 30 Mg Injection***  IM  10/06/19 09:03  60 mg





  STAT ONE   Administration





     





     





     





     


 


Ketorolac Tromethamine  Confirm  10/06/19 09:09  





  Toradol 30 Mg Injection***  Administered  10/06/19 09:10  





  Dose   





  30 mg   





  .ROUTE   





  .STK-MED ONE   





     





     





     





     


 


Ketorolac Tromethamine  Confirm  10/06/19 09:16  





  Toradol 30 Mg Injection***  Administered  10/06/19 09:17  





  Dose   





  30 mg   





  .ROUTE   





  .STK-MED ONE   





     





     





     





     














- Progress


Progress: unchanged, pain not gone completely





- Departure


Departure Disposition: Home


Clinical Impression: 


 Rib pain on left side





Condition: Stable


Critical Care Time: No


Referrals: 


XIN VERDUGO MD [Primary Care Provider] - 


Instructions:  Bruised Rib


Additional Instructions: 


Discharge/Care Plan





СЕРГЕЙ AGUDELOJU BLOOD was seen on 10/06/19 in the Emergency Room. The patient 

was counseled regarding Diagnosis,Lab results, Imaging studies, need for follow 

up and when to return to the Emergency Room.





Prescriptions given:





Discharge Note





I have spoken with the patient and/or caregivers. I have explained the patient'

s condition, diagnosis and treatment plan based on the information available to 

me at this time. I have answered the patient's and/or caregiver's questions and 

addressed any concerns. The patient and/or caregivers have as good 

understanding of the patient's diagnosis, condition and treatment plan as can 

be expected at this point. The vital signs have been stable. The patient's 

condition is stable and appropriate for discharge from the emergency department.





The patient will pursue further outpatient evaluation with the primary care 

physician or other designated or consulting physician as outlined in the 

discharge instructions. The patient and/or caregivers are agreeable to this 

plan of care and follow-up instructions have been explained in detail. The 

patient and/or caregivers have received these instruction. The patient/and or 

caregivers are aware that any significant change in condition or worsening of 

symptoms should prompt an immediate return to this or the closest emergency 

department or call 911. 





СЕРГЕЙ AGUDELOJU BLOOD was seen on 10/06/19 n the Emergency Room. At that time 

you were treated for an emergent condition, during your visit Laboratory, 

Radiology and/or other procedures may have been ordered. It is very important 

that you follow-up with your Primary Care Physician XIN VERDUGO within 

the next 24-48 hours to review your Emergency Room visit and the final results 

of testing that was ordered.  Some test results such as Urine Cultures, Blood 

Cultures, and other cultures if ordered will not be finalized for 24-48 hours.





If you do not have a Primary Care Provider please call the medical records 

department at [phone] ext [phone] to obtain a copy of your results or you may 

sign into our patient portal to obtain these results by visiting us @ [email or web address]

[email or web address] and completing the following steps:





1. Click on the Patient Portal link





2. Click the Patient Self Enrollment Link to complete the enrollment form and 

entering your Medical Record Number [id number]





3. Once the enrollment form is completed you will receive an email with a 

temporary ID and password at the email address you provided. 





4. Next choose a user name and password. Your user name must be at least 4 

characters long and your password must be at least 4 characters long.





5. Choose a security question from the list and provide your answer to the 

question.





If you already have signed into the Health Portal you may access your Health 

Care Information  24/7 by the following steps:





1. Login to  our website @ [email or web address]





2. Enter your original user name and password.





FAQS





The [facility] Health Portal is an online tool that contains your Lab Results, 

Radiology Reports, Visit History, Discharge Instructions and Health Summary 





Lab and Radiology Results will not be available for 72 hours on the portal.





The Portal is a secure site, passwords are encryted and URLs are re-written so 

they cannot be copied and pasted. You and authorized family members are the 

only ones who can access your Portal. Also there is a timeout feature that 

protects your information if you leave the Portal page open.





If you have technical difficulty please use the Contact Us link on the page 

this will allow you to submit any questions you have regarding the Portal or 

you may contact the Medical Record Department at [phone].


Prescriptions: 


Diclofenac Epolamine PATCH*** [Flector PATCH***] 1 each TP BID #30 adh..patch


Naproxen 375 mg*** [Naprosyn 375 mg***] 375 mg PO Q8H #30 tablet

## 2019-10-06 NOTE — XRAY
Indication: Left lower rib pain.  Status post Heimlich maneuver one week ago.



Comparison: None



2 views of the left ribs demonstrates essentially nondisplaced fractures

involving anterior tips of the 7/8/9 ribs with left lung base subsegmental

atelectasis.  No pneumothorax/hemothorax.  Chest reported separately.



Comment: Fractures not reported by the interpreting ER clinician.  Telephone

report given to Dr. Ferrari in the ER at 2006 hrs. on October 6, 2019.

## 2019-10-06 NOTE — XRAY
Indication: Left lower rib pain.  Status post Heimlich maneuver one week ago.



Comparison: April 3, 2015



PA/lateral chest demonstrates new bibasilar subsegmental atelectasis/scarring.

 Remaining heart and lungs normal.  Bony thorax grossly intact.  Left ribs

reported separately.

## 2019-10-08 VITALS — OXYGEN SATURATION: 100 %
